# Patient Record
Sex: FEMALE | Race: OTHER | HISPANIC OR LATINO
[De-identification: names, ages, dates, MRNs, and addresses within clinical notes are randomized per-mention and may not be internally consistent; named-entity substitution may affect disease eponyms.]

---

## 2018-02-15 PROBLEM — Z00.00 ENCOUNTER FOR PREVENTIVE HEALTH EXAMINATION: Status: ACTIVE | Noted: 2018-02-15

## 2018-03-29 ENCOUNTER — RX RENEWAL (OUTPATIENT)
Age: 83
End: 2018-03-29

## 2018-03-29 DIAGNOSIS — R05 COUGH: ICD-10-CM

## 2018-04-10 ENCOUNTER — RX RENEWAL (OUTPATIENT)
Age: 83
End: 2018-04-10

## 2018-04-10 DIAGNOSIS — G47.00 INSOMNIA, UNSPECIFIED: ICD-10-CM

## 2018-04-18 ENCOUNTER — RX RENEWAL (OUTPATIENT)
Age: 83
End: 2018-04-18

## 2018-04-18 DIAGNOSIS — M54.9 DORSALGIA, UNSPECIFIED: ICD-10-CM

## 2018-05-17 ENCOUNTER — APPOINTMENT (OUTPATIENT)
Dept: INTERNAL MEDICINE | Facility: CLINIC | Age: 83
End: 2018-05-17
Payer: MEDICARE

## 2018-05-17 VITALS
BODY MASS INDEX: 27.25 KG/M2 | WEIGHT: 137 LBS | DIASTOLIC BLOOD PRESSURE: 66 MMHG | SYSTOLIC BLOOD PRESSURE: 146 MMHG | HEART RATE: 79 BPM | HEIGHT: 59.5 IN

## 2018-05-17 DIAGNOSIS — Z78.9 OTHER SPECIFIED HEALTH STATUS: ICD-10-CM

## 2018-05-17 DIAGNOSIS — Z98.1 ARTHRODESIS STATUS: ICD-10-CM

## 2018-05-17 PROCEDURE — 99214 OFFICE O/P EST MOD 30 MIN: CPT

## 2018-05-17 RX ORDER — DEXTROMETHORPHAN HYDROBROMIDE, GUAIFENESIN 20; 200 MG/10ML; MG/10ML
100-10 SOLUTION ORAL
Qty: 420 | Refills: 0 | Status: DISCONTINUED | COMMUNITY
Start: 2018-03-29 | End: 2018-05-17

## 2018-05-17 NOTE — HISTORY OF PRESENT ILLNESS
[FreeTextEntry1] : Follow up [de-identified] : 83 years old female with hypertension, type 2 Diabetes, osteoporosis, overall feeling well, chronic cervical spine pain, states CBS well controlled, as per home attendant appetite improved

## 2018-05-17 NOTE — PHYSICAL EXAM
[No Acute Distress] : no acute distress [Well-Appearing] : well-appearing [Normal Sclera/Conjunctiva] : normal sclera/conjunctiva [PERRL] : pupils equal round and reactive to light [EOMI] : extraocular movements intact [No JVD] : no jugular venous distention [Supple] : supple [No Lymphadenopathy] : no lymphadenopathy [Thyroid Normal, No Nodules] : the thyroid was normal and there were no nodules present [No Respiratory Distress] : no respiratory distress  [Clear to Auscultation] : lungs were clear to auscultation bilaterally [Normal Rate] : normal rate  [Regular Rhythm] : with a regular rhythm [Normal S1, S2] : normal S1 and S2 [No Murmur] : no murmur heard [No Edema] : there was no peripheral edema [Soft] : abdomen soft [Non Tender] : non-tender [No Masses] : no abdominal mass palpated [No HSM] : no HSM [Normal Gait] : normal gait [No Focal Deficits] : no focal deficits [de-identified] : tender cervical spine

## 2018-05-17 NOTE — ASSESSMENT
[FreeTextEntry1] : patient scheduled to do general labs next week; refills of ibuprofen given to take only as needed. she will continue all current medications

## 2018-05-25 ENCOUNTER — APPOINTMENT (OUTPATIENT)
Dept: INTERNAL MEDICINE | Facility: CLINIC | Age: 83
End: 2018-05-25
Payer: MEDICARE

## 2018-05-25 PROCEDURE — 36415 COLL VENOUS BLD VENIPUNCTURE: CPT

## 2018-05-29 LAB
25(OH)D3 SERPL-MCNC: 35.2 NG/ML
ALBUMIN SERPL ELPH-MCNC: 4.7 G/DL
ALP BLD-CCNC: 35 U/L
ALT SERPL-CCNC: 14 U/L
ANION GAP SERPL CALC-SCNC: 13 MMOL/L
AST SERPL-CCNC: 19 U/L
BASOPHILS # BLD AUTO: 0.01 K/UL
BASOPHILS NFR BLD AUTO: 0.2 %
BILIRUB SERPL-MCNC: 0.4 MG/DL
BUN SERPL-MCNC: 17 MG/DL
CALCIUM SERPL-MCNC: 9.9 MG/DL
CHLORIDE SERPL-SCNC: 101 MMOL/L
CHOLEST SERPL-MCNC: 184 MG/DL
CHOLEST/HDLC SERPL: 3.5 RATIO
CO2 SERPL-SCNC: 28 MMOL/L
CREAT SERPL-MCNC: 0.9 MG/DL
EOSINOPHIL # BLD AUTO: 0.08 K/UL
EOSINOPHIL NFR BLD AUTO: 1.5 %
FERRITIN SERPL-MCNC: 18 NG/ML
GLUCOSE SERPL-MCNC: 100 MG/DL
HBA1C MFR BLD HPLC: 5.6 %
HCT VFR BLD CALC: 35 %
HDLC SERPL-MCNC: 53 MG/DL
HGB BLD-MCNC: 11.1 G/DL
IMM GRANULOCYTES NFR BLD AUTO: 0 %
IRON SATN MFR SERPL: 18 %
IRON SERPL-MCNC: 56 UG/DL
LDLC SERPL CALC-MCNC: 97 MG/DL
LYMPHOCYTES # BLD AUTO: 1.38 K/UL
LYMPHOCYTES NFR BLD AUTO: 26.5 %
MAN DIFF?: NORMAL
MCHC RBC-ENTMCNC: 28.4 PG
MCHC RBC-ENTMCNC: 31.7 GM/DL
MCV RBC AUTO: 89.5 FL
MONOCYTES # BLD AUTO: 0.17 K/UL
MONOCYTES NFR BLD AUTO: 3.3 %
NEUTROPHILS # BLD AUTO: 3.57 K/UL
NEUTROPHILS NFR BLD AUTO: 68.5 %
PLATELET # BLD AUTO: 276 K/UL
POTASSIUM SERPL-SCNC: 4.6 MMOL/L
PROT SERPL-MCNC: 7.3 G/DL
RBC # BLD: 3.91 M/UL
RBC # FLD: 14.2 %
SODIUM SERPL-SCNC: 142 MMOL/L
T4 FREE SERPL-MCNC: 1.8 NG/DL
TIBC SERPL-MCNC: 314 UG/DL
TRIGL SERPL-MCNC: 171 MG/DL
TSH SERPL-ACNC: 3.36 UIU/ML
UIBC SERPL-MCNC: 258 UG/DL
VIT B12 SERPL-MCNC: 405 PG/ML
WBC # FLD AUTO: 5.21 K/UL

## 2018-06-08 ENCOUNTER — APPOINTMENT (OUTPATIENT)
Dept: INTERNAL MEDICINE | Facility: CLINIC | Age: 83
End: 2018-06-08

## 2018-06-28 ENCOUNTER — APPOINTMENT (OUTPATIENT)
Dept: INTERNAL MEDICINE | Facility: CLINIC | Age: 83
End: 2018-06-28
Payer: MEDICARE

## 2018-06-28 VITALS
HEART RATE: 71 BPM | DIASTOLIC BLOOD PRESSURE: 72 MMHG | SYSTOLIC BLOOD PRESSURE: 150 MMHG | BODY MASS INDEX: 27.06 KG/M2 | HEIGHT: 59.5 IN | WEIGHT: 136 LBS

## 2018-06-28 DIAGNOSIS — H81.13 BENIGN PAROXYSMAL VERTIGO, BILATERAL: ICD-10-CM

## 2018-06-28 DIAGNOSIS — K29.40 CHRONIC ATROPHIC GASTRITIS W/OUT BLEEDING: ICD-10-CM

## 2018-06-28 DIAGNOSIS — M47.812 SPONDYLOSIS W/OUT MYELOPATHY OR RADICULOPATHY, CERVICAL REGION: ICD-10-CM

## 2018-06-28 PROCEDURE — 99214 OFFICE O/P EST MOD 30 MIN: CPT

## 2018-06-28 NOTE — PLAN
[FreeTextEntry1] : patient clinically stable; cervical pain controlled, iron deficiency controlled, to continue all present medications, will follow up in three months for labs cmp, lipids a1c

## 2018-06-28 NOTE — HISTORY OF PRESENT ILLNESS
[FreeTextEntry1] : lab results\par Interview and discussion conducted in Egyptian by Egyptian speaking Physician.\par  [de-identified] : patient here today to review and discuss labs results, no acute complains\par

## 2018-07-13 ENCOUNTER — RX RENEWAL (OUTPATIENT)
Age: 83
End: 2018-07-13

## 2018-09-28 ENCOUNTER — RX RENEWAL (OUTPATIENT)
Age: 83
End: 2018-09-28

## 2018-09-28 RX ORDER — FERROUS SULFATE TAB EC 325 MG (65 MG FE EQUIVALENT) 325 (65 FE) MG
325 (65 FE) TABLET DELAYED RESPONSE ORAL
Qty: 30 | Refills: 5 | Status: ACTIVE | COMMUNITY
Start: 2018-04-10 | End: 1900-01-01

## 2018-10-12 ENCOUNTER — APPOINTMENT (OUTPATIENT)
Dept: INTERNAL MEDICINE | Facility: CLINIC | Age: 83
End: 2018-10-12
Payer: MEDICARE

## 2018-10-12 PROCEDURE — 36415 COLL VENOUS BLD VENIPUNCTURE: CPT

## 2018-10-15 LAB
ALBUMIN SERPL ELPH-MCNC: 4.7 G/DL
ALP BLD-CCNC: 43 U/L
ALT SERPL-CCNC: 17 U/L
ANION GAP SERPL CALC-SCNC: 16 MMOL/L
AST SERPL-CCNC: 26 U/L
BILIRUB SERPL-MCNC: 0.4 MG/DL
BUN SERPL-MCNC: 13 MG/DL
CALCIUM SERPL-MCNC: 10.3 MG/DL
CHLORIDE SERPL-SCNC: 97 MMOL/L
CHOLEST SERPL-MCNC: 211 MG/DL
CHOLEST/HDLC SERPL: 4.2 RATIO
CO2 SERPL-SCNC: 28 MMOL/L
CREAT SERPL-MCNC: 0.97 MG/DL
GLUCOSE SERPL-MCNC: 169 MG/DL
HBA1C MFR BLD HPLC: 6 %
HDLC SERPL-MCNC: 50 MG/DL
LDLC SERPL CALC-MCNC: 127 MG/DL
POTASSIUM SERPL-SCNC: 3.9 MMOL/L
PROT SERPL-MCNC: 7.4 G/DL
SODIUM SERPL-SCNC: 141 MMOL/L
TRIGL SERPL-MCNC: 169 MG/DL

## 2018-10-30 ENCOUNTER — APPOINTMENT (OUTPATIENT)
Dept: INTERNAL MEDICINE | Facility: CLINIC | Age: 83
End: 2018-10-30
Payer: MEDICARE

## 2018-10-30 VITALS
SYSTOLIC BLOOD PRESSURE: 153 MMHG | DIASTOLIC BLOOD PRESSURE: 72 MMHG | WEIGHT: 137 LBS | HEART RATE: 94 BPM | BODY MASS INDEX: 27.25 KG/M2 | HEIGHT: 59.5 IN

## 2018-10-30 DIAGNOSIS — Z23 ENCOUNTER FOR IMMUNIZATION: ICD-10-CM

## 2018-10-30 PROCEDURE — G0008: CPT

## 2018-10-30 PROCEDURE — 90686 IIV4 VACC NO PRSV 0.5 ML IM: CPT

## 2018-10-30 PROCEDURE — 99214 OFFICE O/P EST MOD 30 MIN: CPT | Mod: 25

## 2018-10-30 NOTE — HISTORY OF PRESENT ILLNESS
[FreeTextEntry1] : lab results\par Interview and discussion conducted in Iranian by Iranian speaking Physician.\par  [de-identified] : patient here today to review and discuss labs results, no acute complains\par

## 2018-10-30 NOTE — PLAN
[FreeTextEntry1] : low cholesterol, low triglycerides diet,dietary counseling given; dietary avoidance discussed; diet and exercise reviewed with patient\par Dietary counseling given, dietary avoidance discussed, diet and exercise reviewed with patient; patient reminded of importance of aerobic exercise, weight control, dietary compliance and regular glucose monitoring\par follow up in three months for general labs

## 2018-12-07 ENCOUNTER — RX RENEWAL (OUTPATIENT)
Age: 83
End: 2018-12-07

## 2018-12-07 RX ORDER — PIOGLITAZONE HYDROCHLORIDE 45 MG/1
45 TABLET ORAL DAILY
Qty: 90 | Refills: 1 | Status: ACTIVE | COMMUNITY
Start: 2018-04-10 | End: 1900-01-01

## 2018-12-07 RX ORDER — MECLIZINE HYDROCHLORIDE 12.5 MG/1
12.5 TABLET ORAL
Qty: 30 | Refills: 2 | Status: ACTIVE | COMMUNITY
Start: 2018-04-10 | End: 1900-01-01

## 2018-12-07 RX ORDER — METFORMIN HYDROCHLORIDE 1000 MG/1
1000 TABLET, COATED ORAL
Qty: 180 | Refills: 3 | Status: ACTIVE | COMMUNITY
Start: 2018-04-10 | End: 1900-01-01

## 2018-12-07 RX ORDER — ALENDRONATE SODIUM 70 MG/1
70 TABLET ORAL
Qty: 4 | Refills: 5 | Status: ACTIVE | COMMUNITY
Start: 2018-03-29 | End: 1900-01-01

## 2019-02-26 ENCOUNTER — OTHER (OUTPATIENT)
Age: 84
End: 2019-02-26

## 2019-03-05 ENCOUNTER — APPOINTMENT (OUTPATIENT)
Dept: INTERNAL MEDICINE | Facility: CLINIC | Age: 84
End: 2019-03-05

## 2019-03-14 ENCOUNTER — APPOINTMENT (OUTPATIENT)
Dept: INTERNAL MEDICINE | Facility: CLINIC | Age: 84
End: 2019-03-14
Payer: MEDICARE

## 2019-03-14 PROCEDURE — 36415 COLL VENOUS BLD VENIPUNCTURE: CPT

## 2019-03-15 LAB
25(OH)D3 SERPL-MCNC: 34.1 NG/ML
ALBUMIN SERPL ELPH-MCNC: 4.6 G/DL
ALP BLD-CCNC: 45 U/L
ALT SERPL-CCNC: 19 U/L
ANION GAP SERPL CALC-SCNC: 15 MMOL/L
AST SERPL-CCNC: 24 U/L
BASOPHILS # BLD AUTO: 0.02 K/UL
BASOPHILS NFR BLD AUTO: 0.5 %
BILIRUB SERPL-MCNC: 0.4 MG/DL
BUN SERPL-MCNC: 16 MG/DL
CALCIUM SERPL-MCNC: 10.1 MG/DL
CHLORIDE SERPL-SCNC: 98 MMOL/L
CHOLEST SERPL-MCNC: 208 MG/DL
CHOLEST/HDLC SERPL: 4 RATIO
CO2 SERPL-SCNC: 30 MMOL/L
CREAT SERPL-MCNC: 0.94 MG/DL
EOSINOPHIL # BLD AUTO: 0.14 K/UL
EOSINOPHIL NFR BLD AUTO: 3.2 %
GLUCOSE SERPL-MCNC: 122 MG/DL
HBA1C MFR BLD HPLC: 6.2 %
HCT VFR BLD CALC: 38.1 %
HDLC SERPL-MCNC: 52 MG/DL
HGB BLD-MCNC: 11.8 G/DL
IMM GRANULOCYTES NFR BLD AUTO: 0.2 %
LDLC SERPL CALC-MCNC: 122 MG/DL
LYMPHOCYTES # BLD AUTO: 1.46 K/UL
LYMPHOCYTES NFR BLD AUTO: 33.3 %
MAN DIFF?: NORMAL
MCHC RBC-ENTMCNC: 28.2 PG
MCHC RBC-ENTMCNC: 31 GM/DL
MCV RBC AUTO: 91.1 FL
MONOCYTES # BLD AUTO: 0.24 K/UL
MONOCYTES NFR BLD AUTO: 5.5 %
NEUTROPHILS # BLD AUTO: 2.51 K/UL
NEUTROPHILS NFR BLD AUTO: 57.3 %
PLATELET # BLD AUTO: 293 K/UL
POTASSIUM SERPL-SCNC: 4.5 MMOL/L
PROT SERPL-MCNC: 7.3 G/DL
RBC # BLD: 4.18 M/UL
RBC # FLD: 13.4 %
SODIUM SERPL-SCNC: 142 MMOL/L
T4 FREE SERPL-MCNC: 1.7 NG/DL
TRIGL SERPL-MCNC: 168 MG/DL
TSH SERPL-ACNC: 3.79 UIU/ML
VIT B12 SERPL-MCNC: 441 PG/ML
WBC # FLD AUTO: 4.38 K/UL

## 2019-03-29 ENCOUNTER — APPOINTMENT (OUTPATIENT)
Dept: INTERNAL MEDICINE | Facility: CLINIC | Age: 84
End: 2019-03-29
Payer: MEDICARE

## 2019-03-29 VITALS
BODY MASS INDEX: 27.85 KG/M2 | OXYGEN SATURATION: 99 % | DIASTOLIC BLOOD PRESSURE: 83 MMHG | TEMPERATURE: 97.1 F | HEART RATE: 88 BPM | HEIGHT: 59.5 IN | WEIGHT: 140 LBS | RESPIRATION RATE: 16 BRPM | SYSTOLIC BLOOD PRESSURE: 136 MMHG

## 2019-03-29 DIAGNOSIS — D50.8 OTHER IRON DEFICIENCY ANEMIAS: ICD-10-CM

## 2019-03-29 DIAGNOSIS — M81.0 AGE-RELATED OSTEOPOROSIS W/OUT CURRENT PATHOLOGICAL FRACTURE: ICD-10-CM

## 2019-03-29 PROCEDURE — 99214 OFFICE O/P EST MOD 30 MIN: CPT

## 2019-03-29 NOTE — PLAN
[FreeTextEntry1] : Diet compliance discussed.\par low cholesterol, low triglycerides diet,dietary counseling given; dietary avoidance discussed; diet and exercise reviewed with patient\par Dietary counseling given, dietary avoidance discussed, diet and exercise reviewed with patient; patient reminded of importance of aerobic exercise, weight control, dietary compliance and regular glucose monitoring\par follow up in three months for routine general labs

## 2019-03-29 NOTE — HISTORY OF PRESENT ILLNESS
[FreeTextEntry1] : lab results\par Interview and discussion conducted in Syrian by Syrian speaking Physician.\par  [de-identified] : patient here today to review and discuss labs results, no acute complains\par

## 2019-04-08 ENCOUNTER — RX RENEWAL (OUTPATIENT)
Age: 84
End: 2019-04-08

## 2019-04-08 RX ORDER — ESCITALOPRAM OXALATE 10 MG/1
10 TABLET ORAL DAILY
Qty: 30 | Refills: 2 | Status: ACTIVE | COMMUNITY
Start: 2018-04-10 | End: 1900-01-01

## 2019-04-19 ENCOUNTER — RX RENEWAL (OUTPATIENT)
Age: 84
End: 2019-04-19

## 2019-04-19 RX ORDER — OMEPRAZOLE 40 MG/1
40 CAPSULE, DELAYED RELEASE ORAL
Qty: 30 | Refills: 2 | Status: ACTIVE | COMMUNITY
Start: 2018-04-10 | End: 1900-01-01

## 2019-05-23 ENCOUNTER — RX CHANGE (OUTPATIENT)
Age: 84
End: 2019-05-23

## 2019-05-23 RX ORDER — LOSARTAN POTASSIUM AND HYDROCHLOROTHIAZIDE 12.5; 5 MG/1; MG/1
50-12.5 TABLET ORAL DAILY
Qty: 90 | Refills: 1 | Status: DISCONTINUED | COMMUNITY
Start: 2018-04-10 | End: 2019-05-23

## 2019-06-05 ENCOUNTER — APPOINTMENT (OUTPATIENT)
Dept: INTERNAL MEDICINE | Facility: CLINIC | Age: 84
End: 2019-06-05
Payer: MEDICARE

## 2019-06-05 VITALS
HEIGHT: 59.5 IN | BODY MASS INDEX: 28.65 KG/M2 | RESPIRATION RATE: 16 BRPM | OXYGEN SATURATION: 96 % | WEIGHT: 144 LBS | DIASTOLIC BLOOD PRESSURE: 65 MMHG | HEART RATE: 86 BPM | TEMPERATURE: 98.7 F | SYSTOLIC BLOOD PRESSURE: 125 MMHG

## 2019-06-05 DIAGNOSIS — R07.1 CHEST PAIN ON BREATHING: ICD-10-CM

## 2019-06-05 DIAGNOSIS — M54.2 CERVICALGIA: ICD-10-CM

## 2019-06-05 PROCEDURE — 99213 OFFICE O/P EST LOW 20 MIN: CPT

## 2019-06-05 RX ORDER — IBUPROFEN 400 MG/1
400 TABLET, FILM COATED ORAL 3 TIMES DAILY
Qty: 30 | Refills: 0 | Status: ACTIVE | COMMUNITY
Start: 2018-04-18 | End: 1900-01-01

## 2019-06-05 NOTE — HISTORY OF PRESENT ILLNESS
[FreeTextEntry1] : right breast pain\par neck pain\par Interview and discussion conducted in Chinese by Chinese speaking Physician.\par  [de-identified] : 84 years old female walks in complaining of right side chest pain, as per home attendant she told her she is having pain for several days , also over right upper breast, refers cervical pain as well

## 2019-06-05 NOTE — PHYSICAL EXAM
[No Acute Distress] : no acute distress [Well-Appearing] : well-appearing [No JVD] : no jugular venous distention [Supple] : supple [No Respiratory Distress] : no respiratory distress  [Clear to Auscultation] : lungs were clear to auscultation bilaterally [Normal Rate] : normal rate  [Regular Rhythm] : with a regular rhythm [Normal S1, S2] : normal S1 and S2 [No Murmur] : no murmur heard [de-identified] : tender right costochondral joint 4th space

## 2019-07-10 ENCOUNTER — APPOINTMENT (OUTPATIENT)
Dept: INTERNAL MEDICINE | Facility: CLINIC | Age: 84
End: 2019-07-10
Payer: MEDICARE

## 2019-07-10 PROCEDURE — 36415 COLL VENOUS BLD VENIPUNCTURE: CPT

## 2019-07-11 LAB
ALBUMIN SERPL ELPH-MCNC: 4.5 G/DL
ALP BLD-CCNC: 39 U/L
ALT SERPL-CCNC: 14 U/L
ANION GAP SERPL CALC-SCNC: 11 MMOL/L
AST SERPL-CCNC: 20 U/L
BILIRUB SERPL-MCNC: 0.6 MG/DL
BUN SERPL-MCNC: 14 MG/DL
CALCIUM SERPL-MCNC: 10.1 MG/DL
CHLORIDE SERPL-SCNC: 100 MMOL/L
CHOLEST SERPL-MCNC: 156 MG/DL
CHOLEST/HDLC SERPL: 3.9 RATIO
CO2 SERPL-SCNC: 30 MMOL/L
CREAT SERPL-MCNC: 1.05 MG/DL
ESTIMATED AVERAGE GLUCOSE: 120 MG/DL
GLUCOSE SERPL-MCNC: 103 MG/DL
HBA1C MFR BLD HPLC: 5.8 %
HDLC SERPL-MCNC: 40 MG/DL
LDLC SERPL CALC-MCNC: 82 MG/DL
POTASSIUM SERPL-SCNC: 4.7 MMOL/L
PROT SERPL-MCNC: 7.3 G/DL
SODIUM SERPL-SCNC: 141 MMOL/L
T3FREE SERPL-MCNC: 2.92 PG/ML
T4 FREE SERPL-MCNC: 1.5 NG/DL
TRIGL SERPL-MCNC: 169 MG/DL
TSH SERPL-ACNC: 2.65 UIU/ML

## 2019-07-24 ENCOUNTER — APPOINTMENT (OUTPATIENT)
Dept: INTERNAL MEDICINE | Facility: CLINIC | Age: 84
End: 2019-07-24
Payer: MEDICARE

## 2019-07-24 VITALS
OXYGEN SATURATION: 96 % | TEMPERATURE: 97.3 F | DIASTOLIC BLOOD PRESSURE: 69 MMHG | RESPIRATION RATE: 16 BRPM | SYSTOLIC BLOOD PRESSURE: 130 MMHG | BODY MASS INDEX: 27.45 KG/M2 | WEIGHT: 138 LBS | HEART RATE: 79 BPM | HEIGHT: 59.5 IN

## 2019-07-24 DIAGNOSIS — R05 COUGH: ICD-10-CM

## 2019-07-24 DIAGNOSIS — E78.2 MIXED HYPERLIPIDEMIA: ICD-10-CM

## 2019-07-24 DIAGNOSIS — T46.4X5A COUGH: ICD-10-CM

## 2019-07-24 DIAGNOSIS — I10 ESSENTIAL (PRIMARY) HYPERTENSION: ICD-10-CM

## 2019-07-24 PROCEDURE — 99214 OFFICE O/P EST MOD 30 MIN: CPT

## 2019-07-24 RX ORDER — DONEPEZIL HYDROCHLORIDE 5 MG/1
5 TABLET ORAL
Qty: 30 | Refills: 2 | Status: ACTIVE | COMMUNITY
Start: 2018-04-10 | End: 1900-01-01

## 2019-07-24 RX ORDER — HYDROCHLOROTHIAZIDE 12.5 MG/1
12.5 TABLET ORAL DAILY
Qty: 30 | Refills: 2 | Status: ACTIVE | COMMUNITY
Start: 2019-07-24 | End: 1900-01-01

## 2019-07-24 RX ORDER — AMLODIPINE BESYLATE 5 MG/1
5 TABLET ORAL DAILY
Qty: 30 | Refills: 5 | Status: ACTIVE | COMMUNITY
Start: 2019-07-24 | End: 1900-01-01

## 2019-07-24 NOTE — PLAN
[FreeTextEntry1] : discontinue lisinopril/hctz, change to amlopine  and hctz.\par low cholesterol, low triglycerides diet,dietary counseling given; dietary avoidance discussed; diet and exercise reviewed with patient\par Dietary counseling given, dietary avoidance discussed, diet and exercise reviewed with patient; patient reminded of importance of aerobic exercise, weight control, dietary compliance and regular glucose monitoring\par follow up in three months for labs if cough continue after two weeks to call me

## 2019-07-24 NOTE — PHYSICAL EXAM
[Well Nourished] : well nourished [No Acute Distress] : no acute distress [Well Developed] : well developed [Well-Appearing] : well-appearing [Normal Sclera/Conjunctiva] : normal sclera/conjunctiva [PERRL] : pupils equal round and reactive to light [EOMI] : extraocular movements intact [Normal Outer Ear/Nose] : the outer ears and nose were normal in appearance [No JVD] : no jugular venous distention [Normal Oropharynx] : the oropharynx was normal [No Lymphadenopathy] : no lymphadenopathy [Supple] : supple [Thyroid Normal, No Nodules] : the thyroid was normal and there were no nodules present [No Respiratory Distress] : no respiratory distress  [No Accessory Muscle Use] : no accessory muscle use [Clear to Auscultation] : lungs were clear to auscultation bilaterally [Normal Rate] : normal rate  [Regular Rhythm] : with a regular rhythm [Normal S1, S2] : normal S1 and S2 [No Murmur] : no murmur heard [No Carotid Bruits] : no carotid bruits [No Abdominal Bruit] : a ~M bruit was not heard ~T in the abdomen [No Varicosities] : no varicosities [Pedal Pulses Present] : the pedal pulses are present [No Edema] : there was no peripheral edema [No Palpable Aorta] : no palpable aorta [No Extremity Clubbing/Cyanosis] : no extremity clubbing/cyanosis [Soft] : abdomen soft [Non Tender] : non-tender [Non-distended] : non-distended [No Masses] : no abdominal mass palpated [No HSM] : no HSM [Normal Posterior Cervical Nodes] : no posterior cervical lymphadenopathy [Normal Bowel Sounds] : normal bowel sounds [Normal Anterior Cervical Nodes] : no anterior cervical lymphadenopathy [No CVA Tenderness] : no CVA  tenderness [Grossly Normal Strength/Tone] : grossly normal strength/tone [No Spinal Tenderness] : no spinal tenderness [No Joint Swelling] : no joint swelling [No Rash] : no rash [Coordination Grossly Intact] : coordination grossly intact [Normal Gait] : normal gait [No Focal Deficits] : no focal deficits [Normal Affect] : the affect was normal [Deep Tendon Reflexes (DTR)] : deep tendon reflexes were 2+ and symmetric [Normal Insight/Judgement] : insight and judgment were intact

## 2019-07-24 NOTE — HISTORY OF PRESENT ILLNESS
[FreeTextEntry1] : lab results\par Interview and discussion conducted in South African by South African speaking Physician.\par  [de-identified] : patient here today to review and discuss labs results complains of dry cough since she started lisinopril, \par

## 2019-08-20 ENCOUNTER — APPOINTMENT (OUTPATIENT)
Dept: INTERNAL MEDICINE | Facility: CLINIC | Age: 84
End: 2019-08-20
Payer: MEDICARE

## 2019-08-20 VITALS
RESPIRATION RATE: 16 BRPM | DIASTOLIC BLOOD PRESSURE: 73 MMHG | HEIGHT: 59.5 IN | TEMPERATURE: 98.3 F | WEIGHT: 138 LBS | SYSTOLIC BLOOD PRESSURE: 124 MMHG | HEART RATE: 102 BPM | BODY MASS INDEX: 27.45 KG/M2 | OXYGEN SATURATION: 97 %

## 2019-08-20 DIAGNOSIS — R21 RASH AND OTHER NONSPECIFIC SKIN ERUPTION: ICD-10-CM

## 2019-08-20 DIAGNOSIS — E11.9 TYPE 2 DIABETES MELLITUS W/OUT COMPLICATIONS: ICD-10-CM

## 2019-08-20 DIAGNOSIS — E03.9 HYPOTHYROIDISM, UNSPECIFIED: ICD-10-CM

## 2019-08-20 DIAGNOSIS — L85.3 XEROSIS CUTIS: ICD-10-CM

## 2019-08-20 PROCEDURE — 99213 OFFICE O/P EST LOW 20 MIN: CPT

## 2019-08-20 RX ORDER — MOMETASONE FUROATE 1 MG/G
0.1 CREAM TOPICAL DAILY
Qty: 1 | Refills: 1 | Status: ACTIVE | COMMUNITY
Start: 2019-08-20 | End: 1900-01-01

## 2019-08-20 RX ORDER — AMMONIUM LACTATE 12 %
12 CREAM (GRAM) TOPICAL TWICE DAILY
Qty: 1 | Refills: 2 | Status: ACTIVE | COMMUNITY
Start: 2019-08-20 | End: 1900-01-01

## 2019-08-20 NOTE — PHYSICAL EXAM
[Normal] : no acute distress, well nourished, well developed and well-appearing [de-identified] : dry skin; mild erythematous rash on arms , back and chest

## 2019-08-20 NOTE — HISTORY OF PRESENT ILLNESS
[FreeTextEntry8] : body rash\par Interview and discussion conducted in Upper sorbian by Upper sorbian speaking Physician.\par 84 years old female walks in with home attendant complaining of rash on chest , back and arms since past Saturday associated with pruritus

## 2019-10-02 ENCOUNTER — RX RENEWAL (OUTPATIENT)
Age: 84
End: 2019-10-02

## 2019-10-02 RX ORDER — LEVOTHYROXINE SODIUM 0.05 MG/1
50 TABLET ORAL DAILY
Qty: 90 | Refills: 1 | Status: ACTIVE | COMMUNITY
Start: 2018-04-10 | End: 1900-01-01

## 2019-10-02 RX ORDER — SIMVASTATIN 20 MG/1
20 TABLET, FILM COATED ORAL DAILY
Qty: 90 | Refills: 1 | Status: ACTIVE | COMMUNITY
Start: 2018-04-10 | End: 1900-01-01

## 2019-10-02 RX ORDER — LISINOPRIL AND HYDROCHLOROTHIAZIDE TABLETS 20; 12.5 MG/1; MG/1
20-12.5 TABLET ORAL DAILY
Qty: 90 | Refills: 1 | Status: ACTIVE | COMMUNITY
Start: 2019-05-23 | End: 1900-01-01

## 2019-10-02 RX ORDER — ASPIRIN 81 MG/1
81 TABLET ORAL DAILY
Qty: 90 | Refills: 1 | Status: ACTIVE | COMMUNITY
Start: 2018-04-10 | End: 1900-01-01

## 2019-10-02 RX ORDER — GABAPENTIN 100 MG/1
100 CAPSULE ORAL 3 TIMES DAILY
Qty: 90 | Refills: 1 | Status: ACTIVE | COMMUNITY
Start: 2018-04-10 | End: 1900-01-01

## 2019-10-31 ENCOUNTER — APPOINTMENT (OUTPATIENT)
Dept: INTERNAL MEDICINE | Facility: CLINIC | Age: 84
End: 2019-10-31

## 2021-03-29 ENCOUNTER — INPATIENT (INPATIENT)
Facility: HOSPITAL | Age: 86
LOS: 8 days | Discharge: NURSING, REHAB OR LONG-TERM CARE | DRG: 884 | End: 2021-04-07
Attending: INTERNAL MEDICINE | Admitting: INTERNAL MEDICINE
Payer: MEDICARE

## 2021-03-29 VITALS
RESPIRATION RATE: 18 BRPM | DIASTOLIC BLOOD PRESSURE: 72 MMHG | TEMPERATURE: 98 F | HEART RATE: 82 BPM | SYSTOLIC BLOOD PRESSURE: 125 MMHG | WEIGHT: 138.01 LBS

## 2021-03-29 DIAGNOSIS — F03.90 UNSPECIFIED DEMENTIA WITHOUT BEHAVIORAL DISTURBANCE: ICD-10-CM

## 2021-03-29 LAB
ALBUMIN SERPL ELPH-MCNC: 3.8 G/DL — SIGNIFICANT CHANGE UP (ref 3.5–5)
ALP SERPL-CCNC: 62 U/L — SIGNIFICANT CHANGE UP (ref 40–120)
ALT FLD-CCNC: 32 U/L DA — SIGNIFICANT CHANGE UP (ref 10–60)
ANION GAP SERPL CALC-SCNC: 7 MMOL/L — SIGNIFICANT CHANGE UP (ref 5–17)
APTT BLD: 34.6 SEC — SIGNIFICANT CHANGE UP (ref 27.5–35.5)
AST SERPL-CCNC: 25 U/L — SIGNIFICANT CHANGE UP (ref 10–40)
BASOPHILS # BLD AUTO: 0.03 K/UL — SIGNIFICANT CHANGE UP (ref 0–0.2)
BASOPHILS NFR BLD AUTO: 0.5 % — SIGNIFICANT CHANGE UP (ref 0–2)
BILIRUB SERPL-MCNC: 0.4 MG/DL — SIGNIFICANT CHANGE UP (ref 0.2–1.2)
BUN SERPL-MCNC: 21 MG/DL — HIGH (ref 7–18)
CALCIUM SERPL-MCNC: 9.2 MG/DL — SIGNIFICANT CHANGE UP (ref 8.4–10.5)
CHLORIDE SERPL-SCNC: 103 MMOL/L — SIGNIFICANT CHANGE UP (ref 96–108)
CK SERPL-CCNC: 59 U/L — SIGNIFICANT CHANGE UP (ref 21–215)
CO2 SERPL-SCNC: 28 MMOL/L — SIGNIFICANT CHANGE UP (ref 22–31)
CREAT SERPL-MCNC: 0.92 MG/DL — SIGNIFICANT CHANGE UP (ref 0.5–1.3)
EOSINOPHIL # BLD AUTO: 0.07 K/UL — SIGNIFICANT CHANGE UP (ref 0–0.5)
EOSINOPHIL NFR BLD AUTO: 1.3 % — SIGNIFICANT CHANGE UP (ref 0–6)
GLUCOSE SERPL-MCNC: 127 MG/DL — HIGH (ref 70–99)
HCT VFR BLD CALC: 38 % — SIGNIFICANT CHANGE UP (ref 34.5–45)
HGB BLD-MCNC: 12.2 G/DL — SIGNIFICANT CHANGE UP (ref 11.5–15.5)
IMM GRANULOCYTES NFR BLD AUTO: 0.2 % — SIGNIFICANT CHANGE UP (ref 0–1.5)
INR BLD: 0.96 RATIO — SIGNIFICANT CHANGE UP (ref 0.88–1.16)
LYMPHOCYTES # BLD AUTO: 1.87 K/UL — SIGNIFICANT CHANGE UP (ref 1–3.3)
LYMPHOCYTES # BLD AUTO: 33.9 % — SIGNIFICANT CHANGE UP (ref 13–44)
MCHC RBC-ENTMCNC: 27.7 PG — SIGNIFICANT CHANGE UP (ref 27–34)
MCHC RBC-ENTMCNC: 32.1 GM/DL — SIGNIFICANT CHANGE UP (ref 32–36)
MCV RBC AUTO: 86.4 FL — SIGNIFICANT CHANGE UP (ref 80–100)
MONOCYTES # BLD AUTO: 0.25 K/UL — SIGNIFICANT CHANGE UP (ref 0–0.9)
MONOCYTES NFR BLD AUTO: 4.5 % — SIGNIFICANT CHANGE UP (ref 2–14)
NEUTROPHILS # BLD AUTO: 3.29 K/UL — SIGNIFICANT CHANGE UP (ref 1.8–7.4)
NEUTROPHILS NFR BLD AUTO: 59.6 % — SIGNIFICANT CHANGE UP (ref 43–77)
NRBC # BLD: 0 /100 WBCS — SIGNIFICANT CHANGE UP (ref 0–0)
PLATELET # BLD AUTO: 264 K/UL — SIGNIFICANT CHANGE UP (ref 150–400)
POTASSIUM SERPL-MCNC: 4 MMOL/L — SIGNIFICANT CHANGE UP (ref 3.5–5.3)
POTASSIUM SERPL-SCNC: 4 MMOL/L — SIGNIFICANT CHANGE UP (ref 3.5–5.3)
PROT SERPL-MCNC: 7.8 G/DL — SIGNIFICANT CHANGE UP (ref 6–8.3)
PROTHROM AB SERPL-ACNC: 11.5 SEC — SIGNIFICANT CHANGE UP (ref 10.6–13.6)
RBC # BLD: 4.4 M/UL — SIGNIFICANT CHANGE UP (ref 3.8–5.2)
RBC # FLD: 13.9 % — SIGNIFICANT CHANGE UP (ref 10.3–14.5)
SARS-COV-2 RNA SPEC QL NAA+PROBE: SIGNIFICANT CHANGE UP
SODIUM SERPL-SCNC: 138 MMOL/L — SIGNIFICANT CHANGE UP (ref 135–145)
TROPONIN I SERPL-MCNC: <0.015 NG/ML — SIGNIFICANT CHANGE UP (ref 0–0.04)
WBC # BLD: 5.52 K/UL — SIGNIFICANT CHANGE UP (ref 3.8–10.5)
WBC # FLD AUTO: 5.52 K/UL — SIGNIFICANT CHANGE UP (ref 3.8–10.5)

## 2021-03-29 RX ORDER — INSULIN LISPRO 100/ML
VIAL (ML) SUBCUTANEOUS AT BEDTIME
Refills: 0 | Status: DISCONTINUED | OUTPATIENT
Start: 2021-03-29 | End: 2021-04-07

## 2021-03-29 RX ORDER — HALOPERIDOL DECANOATE 100 MG/ML
2 INJECTION INTRAMUSCULAR ONCE
Refills: 0 | Status: COMPLETED | OUTPATIENT
Start: 2021-03-29 | End: 2021-03-29

## 2021-03-29 RX ORDER — DEXTROSE 50 % IN WATER 50 %
25 SYRINGE (ML) INTRAVENOUS ONCE
Refills: 0 | Status: DISCONTINUED | OUTPATIENT
Start: 2021-03-29 | End: 2021-03-30

## 2021-03-29 RX ORDER — MEMANTINE HYDROCHLORIDE 10 MG/1
5 TABLET ORAL
Refills: 0 | Status: DISCONTINUED | OUTPATIENT
Start: 2021-03-29 | End: 2021-04-07

## 2021-03-29 RX ORDER — SIMVASTATIN 20 MG/1
20 TABLET, FILM COATED ORAL AT BEDTIME
Refills: 0 | Status: DISCONTINUED | OUTPATIENT
Start: 2021-03-29 | End: 2021-04-01

## 2021-03-29 RX ORDER — DONEPEZIL HYDROCHLORIDE 10 MG/1
5 TABLET, FILM COATED ORAL AT BEDTIME
Refills: 0 | Status: DISCONTINUED | OUTPATIENT
Start: 2021-03-29 | End: 2021-04-07

## 2021-03-29 RX ORDER — DEXTROSE 50 % IN WATER 50 %
15 SYRINGE (ML) INTRAVENOUS ONCE
Refills: 0 | Status: DISCONTINUED | OUTPATIENT
Start: 2021-03-29 | End: 2021-03-30

## 2021-03-29 RX ORDER — GABAPENTIN 400 MG/1
100 CAPSULE ORAL THREE TIMES A DAY
Refills: 0 | Status: DISCONTINUED | OUTPATIENT
Start: 2021-03-29 | End: 2021-04-07

## 2021-03-29 RX ORDER — GLUCAGON INJECTION, SOLUTION 0.5 MG/.1ML
1 INJECTION, SOLUTION SUBCUTANEOUS ONCE
Refills: 0 | Status: DISCONTINUED | OUTPATIENT
Start: 2021-03-29 | End: 2021-04-01

## 2021-03-29 RX ORDER — SODIUM CHLORIDE 9 MG/ML
1000 INJECTION, SOLUTION INTRAVENOUS
Refills: 0 | Status: DISCONTINUED | OUTPATIENT
Start: 2021-03-29 | End: 2021-03-30

## 2021-03-29 RX ORDER — PANTOPRAZOLE SODIUM 20 MG/1
40 TABLET, DELAYED RELEASE ORAL
Refills: 0 | Status: DISCONTINUED | OUTPATIENT
Start: 2021-03-29 | End: 2021-04-07

## 2021-03-29 RX ORDER — DEXTROSE 50 % IN WATER 50 %
12.5 SYRINGE (ML) INTRAVENOUS ONCE
Refills: 0 | Status: DISCONTINUED | OUTPATIENT
Start: 2021-03-29 | End: 2021-03-30

## 2021-03-29 RX ORDER — LORATADINE 10 MG/1
10 TABLET ORAL DAILY
Refills: 0 | Status: DISCONTINUED | OUTPATIENT
Start: 2021-03-29 | End: 2021-04-02

## 2021-03-29 RX ORDER — LEVOTHYROXINE SODIUM 125 MCG
50 TABLET ORAL DAILY
Refills: 0 | Status: DISCONTINUED | OUTPATIENT
Start: 2021-03-29 | End: 2021-04-07

## 2021-03-29 RX ORDER — ESCITALOPRAM OXALATE 10 MG/1
10 TABLET, FILM COATED ORAL DAILY
Refills: 0 | Status: DISCONTINUED | OUTPATIENT
Start: 2021-03-29 | End: 2021-04-07

## 2021-03-29 RX ORDER — INSULIN LISPRO 100/ML
VIAL (ML) SUBCUTANEOUS
Refills: 0 | Status: DISCONTINUED | OUTPATIENT
Start: 2021-03-29 | End: 2021-04-07

## 2021-03-29 RX ADMIN — HALOPERIDOL DECANOATE 2 MILLIGRAM(S): 100 INJECTION INTRAMUSCULAR at 22:41

## 2021-03-29 NOTE — ED PROVIDER NOTE - CLINICAL SUMMARY MEDICAL DECISION MAKING FREE TEXT BOX
Pt with worsening dementia. c/o constant neck pain worse at night. no pain currently. admit for placement

## 2021-03-29 NOTE — H&P ADULT - NSHPPHYSICALEXAM_GEN_ALL_CORE
CONSTITUTIONAL: Well appearing, well nourished, awake, alert and in no apparent distress  ENMT: Airway patent, Nasal mucosa clear. Mouth with normal mucosa. Throat has no vesicles, no oropharyngeal exudates and uvula is midline.  EYES: Clear bilaterally, pupils equal, round and reactive to light. EOMI.  CARDIAC: Normal rate, regular rhythm.  Heart sounds S1, S2.  No murmurs, rubs or gallops   RESPIRATORY: Breath sounds clear and equal bilaterally. No wheezes, rhales or rhonchi  MUSCULOSKELETAL: Spine appears normal, range of motion is not limited, no muscle or joint tenderness  EXTREMITIES: No edema, cyanosis or deformity   NEUROLOGICAL: Alert and oriented, no focal deficits, no motor or sensory deficits.  SKIN: No rash, skin turgor    ABd : NT , ND , soft

## 2021-03-29 NOTE — H&P ADULT - ATTENDING COMMENTS
87 yo F   with medical history of  Diabetes Mellitus Hypertension HLD dementia, with worsening agitation at night. patient complaining of left neck, especially at night. Pt was prescribed gabapentin which is not helping. patient had HHA during day but is alone at night. Niece is worried that patient is not safe at home, no increase in HHA hours was available due to pandemic. niece is working on getting pt into nursing home and that requires admission as per niece. no recent illness, no vomiting no diarrhea      Pt's niece Zuly Brown        assessment  --  intractable neck pain, h/o Diabetes Mellitus Hypertension HLD dementia, c-spine hardware    plan  --  admit to med, pain control cont preadmit home meds, gi and dvt profilaxis,  cbc, bmp, mg, phos, lipids, tsh, bld cx, ua, ucx,    pain mgmt eval  phys tx eval  soc wk eval  case mgmt eval

## 2021-03-29 NOTE — H&P ADULT - HISTORY OF PRESENT ILLNESS
87 yo F   with medical history of  Diabetes Mellitus Hypertension HLD dementia, with worsening agitation at night. patient complaining of left neck, especially at night. Pt was prescribed gabapentin which is not helping. patient had HHA during day but is alone at night. Niece is worried that patient is not safe at home, no increase in HHA hours was available due to pandemic. niece is working on getting pt into nursing home and that requires admission as per niece. no recent illness, no      Pt's niece Zuly Brown  87 yo F from home , lives alone, have HHA 9am-5pm ,  with medical history of  Diabetes Mellitus Hypertension HLD dementia, sundowning, brought in by niece for placement patient had HHA during day but is alone at [facility]  patient complaining of left neck pain, especially at night. Pt was prescribed gabapentin which is not helping.  Niece is worried that patient is not safe at home, no increase in HHA hours was available due to pandemic. niece is working on getting pt into nursing home and that requires admission.      Pt's niece Zuly Kevin     GOC : DNR/DNI

## 2021-03-29 NOTE — H&P ADULT - ASSESSMENT
85 yo F from home , lives alone, have HHA 9am-5pm ,  with medical history of  Diabetes Mellitus Hypertension HLD dementia, sundowning, brought in by niece for placement patient had HHA during day but is alone at [facility] [employer]  patient complaining of left neck pain, especially at night. Pt was prescribed gabapentin which is not helping.  Niece is worried that patient is not safe at home, no increase in HHA hours was available due to pandemic. niece is working on getting pt into nursing home and that requires admission.

## 2021-03-29 NOTE — ED PROVIDER NOTE - CPE EDP GU NORM
September 25, 2017      Margaret Ramirezm  [address] [address]            Dear Margaret,    Your labs were normal for you.  Feel free to contact me with any questions or concerns.  Thank you for allowing me to participate in your care.    Amparo Hernandze APRN, CNP/ak        Results for orders placed or performed in visit on 09/22/17   Lipid panel reflex to direct LDL   Result Value Ref Range    Cholesterol 233 (H) <200 mg/dL    Triglycerides 161 (H) <150 mg/dL    HDL Cholesterol 72 >49 mg/dL    LDL Cholesterol Calculated 129 (H) <100 mg/dL    Non HDL Cholesterol 161 (H) <130 mg/dL   Comprehensive metabolic panel (BMP + Alb, Alk Phos, ALT, AST, Total. Bili, TP)   Result Value Ref Range    Sodium 139 133 - 144 mmol/L    Potassium 3.9 3.4 - 5.3 mmol/L    Chloride 102 94 - 109 mmol/L    Carbon Dioxide 29 20 - 32 mmol/L    Anion Gap 8 3 - 14 mmol/L    Glucose 91 70 - 99 mg/dL    Urea Nitrogen 9 7 - 30 mg/dL    Creatinine 0.69 0.52 - 1.04 mg/dL    GFR Estimate 87 >60 mL/min/1.7m2    GFR Estimate If Black >90 >60 mL/min/1.7m2    Calcium 8.9 8.5 - 10.1 mg/dL    Bilirubin Total 1.0 0.2 - 1.3 mg/dL    Albumin 3.9 3.4 - 5.0 g/dL    Protein Total 8.3 6.8 - 8.8 g/dL    Alkaline Phosphatase 108 40 - 150 U/L    ALT 24 0 - 50 U/L    AST 25 0 - 45 U/L   CBC with platelets   Result Value Ref Range    WBC 4.6 4.0 - 11.0 10e9/L    RBC Count 3.94 3.8 - 5.2 10e12/L    Hemoglobin 12.7 11.7 - 15.7 g/dL    Hematocrit 37.4 35.0 - 47.0 %    MCV 95 78 - 100 fl    MCH 32.2 26.5 - 33.0 pg    MCHC 34.0 31.5 - 36.5 g/dL    RDW 12.1 10.0 - 15.0 %    Platelet Count 201 150 - 450 10e9/L   Ferritin   Result Value Ref Range    Ferritin 105 8 - 252 ng/mL   Iron and iron binding capacity   Result Value Ref Range    Iron 159 35 - 180 ug/dL    Iron Binding Cap 351 240 - 430 ug/dL    Iron Saturation Index 45 15 - 46 %   Folate   Result Value Ref Range    Folate 23.9 >5.4 ng/mL   Vitamin B12   Result Value Ref Range    Vitamin B12 567 193  - 986 pg/mL   TSH with free T4 reflex   Result Value Ref Range    TSH 0.66 0.40 - 4.00 mU/L   NEISSERIA GONORRHOEA PCR   Result Value Ref Range    Specimen Descrip Cervix     N Gonorrhea PCR Negative NEG^Negative   CHLAMYDIA TRACHOMATIS PCR   Result Value Ref Range    Specimen Description Cervix     Chlamydia Trachomatis PCR Negative NEG^Negative   Wet prep   Result Value Ref Range    Specimen Description Vagina     Wet Prep No yeast seen     Wet Prep No clue cells seen     Wet Prep No Trichomonas seen                       - - -

## 2021-03-29 NOTE — ED PROVIDER NOTE - OBJECTIVE STATEMENT
Pt's niece Zuly Brown   Patient with Diabetes Mellitus Hypertension HLD dementia, with worsening agitation at night. patient complaining of left neck, especially at night. Pt was prescribed gabapentin which is not helping. patient had HHA during day but is alone at night. Niece is worried that patient is not safe at home, no increase in HHA hours was available due to pandemic. niece is working on getting pt into nursing home and that requires admission as per niece. no recent illness, no vomiting no diarrhea

## 2021-03-29 NOTE — ED PROVIDER NOTE - PHYSICAL EXAMINATION
no midline neck tenderness to palpation. awake alert. strength and sensation intact in both arms and legs

## 2021-03-30 DIAGNOSIS — F03.90 UNSPECIFIED DEMENTIA WITHOUT BEHAVIORAL DISTURBANCE: ICD-10-CM

## 2021-03-30 DIAGNOSIS — E03.9 HYPOTHYROIDISM, UNSPECIFIED: ICD-10-CM

## 2021-03-30 DIAGNOSIS — Z29.9 ENCOUNTER FOR PROPHYLACTIC MEASURES, UNSPECIFIED: ICD-10-CM

## 2021-03-30 DIAGNOSIS — M54.2 CERVICALGIA: ICD-10-CM

## 2021-03-30 DIAGNOSIS — I10 ESSENTIAL (PRIMARY) HYPERTENSION: ICD-10-CM

## 2021-03-30 DIAGNOSIS — E11.9 TYPE 2 DIABETES MELLITUS WITHOUT COMPLICATIONS: ICD-10-CM

## 2021-03-30 LAB
A1C WITH ESTIMATED AVERAGE GLUCOSE RESULT: 6.8 % — HIGH (ref 4–5.6)
ALBUMIN SERPL ELPH-MCNC: 4.1 G/DL — SIGNIFICANT CHANGE UP (ref 3.5–5)
ALP SERPL-CCNC: 48 U/L — SIGNIFICANT CHANGE UP (ref 40–120)
ALT FLD-CCNC: 35 U/L DA — SIGNIFICANT CHANGE UP (ref 10–60)
ANION GAP SERPL CALC-SCNC: 12 MMOL/L — SIGNIFICANT CHANGE UP (ref 5–17)
AST SERPL-CCNC: 36 U/L — SIGNIFICANT CHANGE UP (ref 10–40)
BILIRUB SERPL-MCNC: 0.5 MG/DL — SIGNIFICANT CHANGE UP (ref 0.2–1.2)
BUN SERPL-MCNC: 23 MG/DL — HIGH (ref 7–18)
CALCIUM SERPL-MCNC: 9.1 MG/DL — SIGNIFICANT CHANGE UP (ref 8.4–10.5)
CHLORIDE SERPL-SCNC: 100 MMOL/L — SIGNIFICANT CHANGE UP (ref 96–108)
CHOLEST SERPL-MCNC: 272 MG/DL — HIGH
CO2 SERPL-SCNC: 24 MMOL/L — SIGNIFICANT CHANGE UP (ref 22–31)
COVID-19 SPIKE DOMAIN AB INTERP: NEGATIVE — SIGNIFICANT CHANGE UP
COVID-19 SPIKE DOMAIN ANTIBODY RESULT: 0.4 U/ML — SIGNIFICANT CHANGE UP
CREAT SERPL-MCNC: 1.01 MG/DL — SIGNIFICANT CHANGE UP (ref 0.5–1.3)
ESTIMATED AVERAGE GLUCOSE: 148 MG/DL — HIGH (ref 68–114)
GLUCOSE BLDC GLUCOMTR-MCNC: 145 MG/DL — HIGH (ref 70–99)
GLUCOSE BLDC GLUCOMTR-MCNC: 198 MG/DL — HIGH (ref 70–99)
GLUCOSE BLDC GLUCOMTR-MCNC: 224 MG/DL — HIGH (ref 70–99)
GLUCOSE BLDC GLUCOMTR-MCNC: 93 MG/DL — SIGNIFICANT CHANGE UP (ref 70–99)
GLUCOSE SERPL-MCNC: 181 MG/DL — HIGH (ref 70–99)
HCT VFR BLD CALC: 35.6 % — SIGNIFICANT CHANGE UP (ref 34.5–45)
HCYS SERPL-MCNC: 12.2 UMOL/L — SIGNIFICANT CHANGE UP
HDLC SERPL-MCNC: 57 MG/DL — SIGNIFICANT CHANGE UP
HGB BLD-MCNC: 11.7 G/DL — SIGNIFICANT CHANGE UP (ref 11.5–15.5)
LIPID PNL WITH DIRECT LDL SERPL: 193 MG/DL — HIGH
MAGNESIUM SERPL-MCNC: 1.7 MG/DL — SIGNIFICANT CHANGE UP (ref 1.6–2.6)
MCHC RBC-ENTMCNC: 27.7 PG — SIGNIFICANT CHANGE UP (ref 27–34)
MCHC RBC-ENTMCNC: 32.9 GM/DL — SIGNIFICANT CHANGE UP (ref 32–36)
MCV RBC AUTO: 84.4 FL — SIGNIFICANT CHANGE UP (ref 80–100)
NON HDL CHOLESTEROL: 215 MG/DL — HIGH
NRBC # BLD: 0 /100 WBCS — SIGNIFICANT CHANGE UP (ref 0–0)
PHOSPHATE SERPL-MCNC: 2.8 MG/DL — SIGNIFICANT CHANGE UP (ref 2.5–4.5)
PLATELET # BLD AUTO: 285 K/UL — SIGNIFICANT CHANGE UP (ref 150–400)
POTASSIUM SERPL-MCNC: 3.7 MMOL/L — SIGNIFICANT CHANGE UP (ref 3.5–5.3)
POTASSIUM SERPL-SCNC: 3.7 MMOL/L — SIGNIFICANT CHANGE UP (ref 3.5–5.3)
PROT SERPL-MCNC: 7.7 G/DL — SIGNIFICANT CHANGE UP (ref 6–8.3)
RBC # BLD: 4.22 M/UL — SIGNIFICANT CHANGE UP (ref 3.8–5.2)
RBC # FLD: 14.2 % — SIGNIFICANT CHANGE UP (ref 10.3–14.5)
SARS-COV-2 IGG+IGM SERPL QL IA: 0.4 U/ML — SIGNIFICANT CHANGE UP
SARS-COV-2 IGG+IGM SERPL QL IA: NEGATIVE — SIGNIFICANT CHANGE UP
SODIUM SERPL-SCNC: 136 MMOL/L — SIGNIFICANT CHANGE UP (ref 135–145)
TRIGL SERPL-MCNC: 111 MG/DL — SIGNIFICANT CHANGE UP
TSH SERPL-MCNC: 5.46 UU/ML — HIGH (ref 0.34–4.82)
WBC # BLD: 6.9 K/UL — SIGNIFICANT CHANGE UP (ref 3.8–10.5)
WBC # FLD AUTO: 6.9 K/UL — SIGNIFICANT CHANGE UP (ref 3.8–10.5)

## 2021-03-30 RX ORDER — LANOLIN ALCOHOL/MO/W.PET/CERES
3 CREAM (GRAM) TOPICAL AT BEDTIME
Refills: 0 | Status: DISCONTINUED | OUTPATIENT
Start: 2021-03-30 | End: 2021-03-31

## 2021-03-30 RX ORDER — ACETAMINOPHEN 500 MG
650 TABLET ORAL EVERY 6 HOURS
Refills: 0 | Status: COMPLETED | OUTPATIENT
Start: 2021-03-30 | End: 2021-04-04

## 2021-03-30 RX ORDER — ONDANSETRON 8 MG/1
4 TABLET, FILM COATED ORAL ONCE
Refills: 0 | Status: DISCONTINUED | OUTPATIENT
Start: 2021-03-30 | End: 2021-04-01

## 2021-03-30 RX ORDER — OLANZAPINE 15 MG/1
2.5 TABLET, FILM COATED ORAL ONCE
Refills: 0 | Status: COMPLETED | OUTPATIENT
Start: 2021-03-30 | End: 2021-03-30

## 2021-03-30 RX ORDER — HALOPERIDOL DECANOATE 100 MG/ML
2 INJECTION INTRAMUSCULAR ONCE
Refills: 0 | Status: COMPLETED | OUTPATIENT
Start: 2021-03-30 | End: 2021-03-30

## 2021-03-30 RX ORDER — SENNA PLUS 8.6 MG/1
2 TABLET ORAL AT BEDTIME
Refills: 0 | Status: DISCONTINUED | OUTPATIENT
Start: 2021-03-30 | End: 2021-04-07

## 2021-03-30 RX ORDER — LIDOCAINE 4 G/100G
1 CREAM TOPICAL DAILY
Refills: 0 | Status: DISCONTINUED | OUTPATIENT
Start: 2021-03-30 | End: 2021-04-07

## 2021-03-30 RX ADMIN — Medication 650 MILLIGRAM(S): at 18:37

## 2021-03-30 RX ADMIN — Medication 650 MILLIGRAM(S): at 11:04

## 2021-03-30 RX ADMIN — MEMANTINE HYDROCHLORIDE 5 MILLIGRAM(S): 10 TABLET ORAL at 17:36

## 2021-03-30 RX ADMIN — Medication 650 MILLIGRAM(S): at 17:37

## 2021-03-30 RX ADMIN — GABAPENTIN 100 MILLIGRAM(S): 400 CAPSULE ORAL at 05:19

## 2021-03-30 RX ADMIN — Medication 50 MICROGRAM(S): at 05:18

## 2021-03-30 RX ADMIN — LORATADINE 10 MILLIGRAM(S): 10 TABLET ORAL at 10:53

## 2021-03-30 RX ADMIN — Medication 650 MILLIGRAM(S): at 12:04

## 2021-03-30 RX ADMIN — OLANZAPINE 2.5 MILLIGRAM(S): 15 TABLET, FILM COATED ORAL at 15:23

## 2021-03-30 RX ADMIN — SIMVASTATIN 20 MILLIGRAM(S): 20 TABLET, FILM COATED ORAL at 22:15

## 2021-03-30 RX ADMIN — Medication 3 MILLIGRAM(S): at 22:15

## 2021-03-30 RX ADMIN — PANTOPRAZOLE SODIUM 40 MILLIGRAM(S): 20 TABLET, DELAYED RELEASE ORAL at 05:17

## 2021-03-30 RX ADMIN — GABAPENTIN 100 MILLIGRAM(S): 400 CAPSULE ORAL at 22:15

## 2021-03-30 RX ADMIN — ESCITALOPRAM OXALATE 10 MILLIGRAM(S): 10 TABLET, FILM COATED ORAL at 10:53

## 2021-03-30 RX ADMIN — SENNA PLUS 2 TABLET(S): 8.6 TABLET ORAL at 22:15

## 2021-03-30 RX ADMIN — Medication 1: at 08:17

## 2021-03-30 RX ADMIN — HALOPERIDOL DECANOATE 2 MILLIGRAM(S): 100 INJECTION INTRAMUSCULAR at 00:58

## 2021-03-30 RX ADMIN — Medication 2: at 17:16

## 2021-03-30 RX ADMIN — GABAPENTIN 100 MILLIGRAM(S): 400 CAPSULE ORAL at 13:06

## 2021-03-30 RX ADMIN — DONEPEZIL HYDROCHLORIDE 5 MILLIGRAM(S): 10 TABLET, FILM COATED ORAL at 22:15

## 2021-03-30 RX ADMIN — MEMANTINE HYDROCHLORIDE 5 MILLIGRAM(S): 10 TABLET ORAL at 05:18

## 2021-03-30 NOTE — PHYSICAL THERAPY INITIAL EVALUATION ADULT - DIAGNOSIS, PT EVAL
No focal deficits noted, decreased strength and decreased balance and decreased judgement puts pt at risk of falls.

## 2021-03-30 NOTE — PROGRESS NOTE ADULT - SUBJECTIVE AND OBJECTIVE BOX
Patient is a 86y old  Female who presents with a chief complaint of placement (29 Mar 2021 17:53)    pt seen in icu [  ], reg med floor [   ], bed [  ], chair at bedside [   ], a+o x3 [  ], lethargic [  ],  nad [  ]    puente [  ], ngt [  ], peg [  ], et tube [  ], cent line [  ], picc line [  ]        Allergies    No Known Allergies        Vitals    T(F): 98.6 (03-30-21 @ 05:04), Max: 98.6 (03-30-21 @ 05:04)  HR: 112 (03-30-21 @ 05:04) (82 - 123)  BP: 136/75 (03-30-21 @ 05:04) (125/72 - 144/76)  RR: 15 (03-30-21 @ 05:04) (15 - 18)  SpO2: 96% (03-30-21 @ 05:04) (96% - 100%)  Wt(kg): --  CAPILLARY BLOOD GLUCOSE          Labs                          12.2   5.52  )-----------( 264      ( 29 Mar 2021 15:50 )             38.0       03-29    138  |  103  |  21<H>  ----------------------------<  127<H>  4.0   |  28  |  0.92    Ca    9.2      29 Mar 2021 15:50    TPro  7.8  /  Alb  3.8  /  TBili  0.4  /  DBili  x   /  AST  25  /  ALT  32  /  AlkPhos  62  03-29      CARDIAC MARKERS ( 29 Mar 2021 15:50 )  <0.015 ng/mL / x     / 59 U/L / x     / x                Radiology Results      Meds    MEDICATIONS  (STANDING):  dextrose 40% Gel 15 Gram(s) Oral once  dextrose 5%. 1000 milliLiter(s) (50 mL/Hr) IV Continuous <Continuous>  dextrose 5%. 1000 milliLiter(s) (100 mL/Hr) IV Continuous <Continuous>  dextrose 50% Injectable 25 Gram(s) IV Push once  dextrose 50% Injectable 12.5 Gram(s) IV Push once  dextrose 50% Injectable 25 Gram(s) IV Push once  donepezil 5 milliGRAM(s) Oral at bedtime  escitalopram 10 milliGRAM(s) Oral daily  gabapentin 100 milliGRAM(s) Oral three times a day  glucagon  Injectable 1 milliGRAM(s) IntraMuscular once  hydrochlorothiazide 12.5 milliGRAM(s) Oral daily  insulin lispro (ADMELOG) corrective regimen sliding scale   SubCutaneous three times a day before meals  insulin lispro (ADMELOG) corrective regimen sliding scale   SubCutaneous at bedtime  levothyroxine 50 MICROGram(s) Oral daily  loratadine 10 milliGRAM(s) Oral daily  memantine 5 milliGRAM(s) Oral two times a day  pantoprazole    Tablet 40 milliGRAM(s) Oral before breakfast  simvastatin 20 milliGRAM(s) Oral at bedtime      MEDICATIONS  (PRN):  ondansetron Injectable 4 milliGRAM(s) IV Push once PRN Nausea      Physical Exam    Neuro :  no focal deficits  Respiratory: CTA B/L  CV: RRR, S1S2, no murmurs,   Abdominal: Soft, NT, ND +BS,  Extremities: No edema, + peripheral pulses    ASSESSMENT    Dementia without behavioral disturbance    Yes        PLAN     Patient is a 86y old  Female who presents with a chief complaint of placement (29 Mar 2021 17:53)    pt seen in icu [  ], reg med floor [ x  ], bed [ x ], chair at bedside [   ], a+o x3 [ x ], lethargic [  ],  nad [ x ]        Allergies    No Known Allergies        Vitals    T(F): 98.6 (03-30-21 @ 05:04), Max: 98.6 (03-30-21 @ 05:04)  HR: 112 (03-30-21 @ 05:04) (82 - 123)  BP: 136/75 (03-30-21 @ 05:04) (125/72 - 144/76)  RR: 15 (03-30-21 @ 05:04) (15 - 18)  SpO2: 96% (03-30-21 @ 05:04) (96% - 100%)  Wt(kg): --  CAPILLARY BLOOD GLUCOSE          Labs                          12.2   5.52  )-----------( 264      ( 29 Mar 2021 15:50 )             38.0       03-29    138  |  103  |  21<H>  ----------------------------<  127<H>  4.0   |  28  |  0.92    Ca    9.2      29 Mar 2021 15:50    TPro  7.8  /  Alb  3.8  /  TBili  0.4  /  DBili  x   /  AST  25  /  ALT  32  /  AlkPhos  62  03-29      CARDIAC MARKERS ( 29 Mar 2021 15:50 )  <0.015 ng/mL / x     / 59 U/L / x     / x                Radiology Results      Meds    MEDICATIONS  (STANDING):  dextrose 40% Gel 15 Gram(s) Oral once  dextrose 5%. 1000 milliLiter(s) (50 mL/Hr) IV Continuous <Continuous>  dextrose 5%. 1000 milliLiter(s) (100 mL/Hr) IV Continuous <Continuous>  dextrose 50% Injectable 25 Gram(s) IV Push once  dextrose 50% Injectable 12.5 Gram(s) IV Push once  dextrose 50% Injectable 25 Gram(s) IV Push once  donepezil 5 milliGRAM(s) Oral at bedtime  escitalopram 10 milliGRAM(s) Oral daily  gabapentin 100 milliGRAM(s) Oral three times a day  glucagon  Injectable 1 milliGRAM(s) IntraMuscular once  hydrochlorothiazide 12.5 milliGRAM(s) Oral daily  insulin lispro (ADMELOG) corrective regimen sliding scale   SubCutaneous three times a day before meals  insulin lispro (ADMELOG) corrective regimen sliding scale   SubCutaneous at bedtime  levothyroxine 50 MICROGram(s) Oral daily  loratadine 10 milliGRAM(s) Oral daily  memantine 5 milliGRAM(s) Oral two times a day  pantoprazole    Tablet 40 milliGRAM(s) Oral before breakfast  simvastatin 20 milliGRAM(s) Oral at bedtime      MEDICATIONS  (PRN):  ondansetron Injectable 4 milliGRAM(s) IV Push once PRN Nausea      Physical Exam    Neuro :  no focal deficits  Respiratory: CTA B/L  CV: RRR, S1S2, no murmurs,   Abdominal: Soft, NT, ND +BS,  Extremities: No edema, + peripheral pulses    ASSESSMENT      intractable neck pain,   h/o Diabetes Mellitus   Hypertension   HLD   dementia,   c-spine hardware        PLAN      cont pain control   pain mgmt eval  phys tx eval  soc wk eval  case mgmt eval.  lispro ss  cont current meds   d/c planning

## 2021-03-30 NOTE — PROGRESS NOTE ADULT - SUBJECTIVE AND OBJECTIVE BOX
PGY 3 Note discussed with primary attending.    Beeper no.: [phone]               (7:30 AM to 5:00 PM)  Please consult on call team for urgent queries after 5PM        Patient is a 86y old  Female who presents with a chief complaint of placement (30 Mar 2021 09:11)      INTERVAL HPI/OVERNIGHT EVENTS: patient received haldolx2 for agitation overnight; assessed bedside is currently calm yet confused.     MEDICATIONS  (STANDING):  dextrose 40% Gel 15 Gram(s) Oral once  dextrose 5%. 1000 milliLiter(s) (50 mL/Hr) IV Continuous <Continuous>  dextrose 5%. 1000 milliLiter(s) (100 mL/Hr) IV Continuous <Continuous>  dextrose 50% Injectable 25 Gram(s) IV Push once  dextrose 50% Injectable 12.5 Gram(s) IV Push once  dextrose 50% Injectable 25 Gram(s) IV Push once  donepezil 5 milliGRAM(s) Oral at bedtime  escitalopram 10 milliGRAM(s) Oral daily  gabapentin 100 milliGRAM(s) Oral three times a day  glucagon  Injectable 1 milliGRAM(s) IntraMuscular once  hydrochlorothiazide 12.5 milliGRAM(s) Oral daily  insulin lispro (ADMELOG) corrective regimen sliding scale   SubCutaneous three times a day before meals  insulin lispro (ADMELOG) corrective regimen sliding scale   SubCutaneous at bedtime  levothyroxine 50 MICROGram(s) Oral daily  loratadine 10 milliGRAM(s) Oral daily  melatonin 3 milliGRAM(s) Oral at bedtime  memantine 5 milliGRAM(s) Oral two times a day  pantoprazole    Tablet 40 milliGRAM(s) Oral before breakfast  simvastatin 20 milliGRAM(s) Oral at bedtime    MEDICATIONS  (PRN):  ondansetron Injectable 4 milliGRAM(s) IV Push once PRN Nausea      Allergies    No Known Allergies    Intolerances    Vital Signs Last 24 Hrs  T(C): 37 (30 Mar 2021 05:04), Max: 37 (30 Mar 2021 05:04)  T(F): 98.6 (30 Mar 2021 05:04), Max: 98.6 (30 Mar 2021 05:04)  HR: 112 (30 Mar 2021 05:04) (82 - 123)  BP: 136/75 (30 Mar 2021 05:04) (125/72 - 144/76)  BP(mean): --  RR: 15 (30 Mar 2021 05:04) (15 - 18)  SpO2: 96% (30 Mar 2021 05:04) (96% - 100%)    PHYSICAL EXAM:  GENERAL: NAD, well-groomed, well-developed  HEAD:  Atraumatic, Normocephalic  EYES: EOMI, PERRLA, conjunctiva and sclera clear  NECK: Supple, No JVD, Normal thyroid  CHEST/LUNG: Clear to percussion bilaterally; No rales, rhonchi, wheezing, or rubs  HEART: Regular rate and rhythm; No murmurs, rubs, or gallops  ABDOMEN: Soft, Nontender, Nondistended; Bowel sounds present  NERVOUS SYSTEM:  Alert & Awake  EXTREMITIES:  2+ Peripheral Pulses, No clubbing, cyanosis, or edema    LABS:                        12.2   5.52  )-----------( 264      ( 29 Mar 2021 15:50 )             38.0     03-29    138  |  103  |  21<H>  ----------------------------<  127<H>  4.0   |  28  |  0.92    Ca    9.2      29 Mar 2021 15:50    TPro  7.8  /  Alb  3.8  /  TBili  0.4  /  DBili  x   /  AST  25  /  ALT  32  /  AlkPhos  62  03-29    PT/INR - ( 29 Mar 2021 15:50 )   PT: 11.5 sec;   INR: 0.96 ratio         PTT - ( 29 Mar 2021 15:50 )  PTT:34.6 sec    CAPILLARY BLOOD GLUCOSE      POCT Blood Glucose.: 198 mg/dL (30 Mar 2021 08:13)    Consultant(s) Notes Reviewed:  [ x ] YES  [ ] NO

## 2021-03-30 NOTE — CONSULT NOTE ADULT - PROBLEM SELECTOR RECOMMENDATION 9
Pt with posterior neck pain which is somatic, neuropathic and chronic in nature. Pt with hx of cervical spine hardware.  High risk medications reviewed. Avoid polypharmacy. Avoid IV opioids. Avoid NSAIDs and benzodiazepines. Non-pharmacological sleep aides initiated. Non-opioid medications and non-pharmacological pain management measures initiated.  Maximize non-opioid pain recommendations   - Continue acetaminophen 650mg PO q 6 hours   - Continue gabapentin 100mg PO TID- pts home medication  - Lidoderm 5% patch daily.   Bowel Regimen  - Senna 2 tablets at bedtime for constipation  Mild pain   - Non-pharmacological pain treatment recommendations  - Warm/ Cool packs PRN   - Repositioning, imagery, relaxation, distraction.  - Physical therapy OOB if no contraindications   Recommendations discussed with primary team and RN

## 2021-03-30 NOTE — CONSULT NOTE ADULT - SUBJECTIVE AND OBJECTIVE BOX
Source of information: SHANELL FAULKNER, Chart review  Patient language: Jordanian  : [id number]    HPI:  85 yo F from home , lives alone, have HHA 9am-5pm ,  with medical history of  Diabetes Mellitus Hypertension HLD dementia, sundowning, brought in by niece for placement patient had HHA during day but is alone at [facility]  patient complaining of left neck pain, especially at night. Pt was prescribed gabapentin which is not helping.  Niece is worried that patient is not safe at home, no increase in HHA hours was available due to pandemic. niece is working on getting pt into nursing home and that requires admission.      Pt's niece Zuly Brown     GOC : DNR/DNI (29 Mar 2021 17:53)      Patient is a 86y old  Female with PMH dementia, diabetes, HTN hypothyroidism, neck pain who presents with dementia and concern for placement. Pain consulted for posterior neck pain. Pt seen and examined at bedside. Pt A&O&1 to self. Reports mild and tolerable posterior neck pain SCALE USED: (1-10 VNRS). Pt describes pain as aching, non-radiating, alleviated by pain medication, exacerbated by movement. Pt tolerating PO diet. Denies lethargy, nausea, vomiting, constipation, itchiness. Unable to obtain pain goal due to hx dementia.     PAST MEDICAL & SURGICAL HISTORY: dementia, diabetes, HTN hypothyroidism, neck pain  cervical spine surgery- + cervical hardware       FAMILY HISTORY: unable to obtain. hx dementia       Social History:  Alcohol: Denied  Smoking: former   Illicit drugs: Denied (29 Mar 2021 17:53)    Allergies    No Known Allergies    MEDICATIONS  (STANDING):  acetaminophen   Tablet .. 650 milliGRAM(s) Oral every 6 hours  donepezil 5 milliGRAM(s) Oral at bedtime  escitalopram 10 milliGRAM(s) Oral daily  gabapentin 100 milliGRAM(s) Oral three times a day  glucagon  Injectable 1 milliGRAM(s) IntraMuscular once  hydrochlorothiazide 12.5 milliGRAM(s) Oral daily  insulin lispro (ADMELOG) corrective regimen sliding scale   SubCutaneous three times a day before meals  insulin lispro (ADMELOG) corrective regimen sliding scale   SubCutaneous at bedtime  levothyroxine 50 MICROGram(s) Oral daily  loratadine 10 milliGRAM(s) Oral daily  melatonin 3 milliGRAM(s) Oral at bedtime  memantine 5 milliGRAM(s) Oral two times a day  pantoprazole    Tablet 40 milliGRAM(s) Oral before breakfast  simvastatin 20 milliGRAM(s) Oral at bedtime    MEDICATIONS  (PRN):  ondansetron Injectable 4 milliGRAM(s) IV Push once PRN Nausea      Vital Signs Last 24 Hrs  T(C): 37 (30 Mar 2021 05:04), Max: 37 (30 Mar 2021 05:04)  T(F): 98.6 (30 Mar 2021 05:04), Max: 98.6 (30 Mar 2021 05:04)  HR: 112 (30 Mar 2021 05:04) (82 - 123)  BP: 136/75 (30 Mar 2021 05:04) (125/72 - 144/76)  BP(mean): --  RR: 15 (30 Mar 2021 05:04) (15 - 18)  SpO2: 96% (30 Mar 2021 05:04) (96% - 100%)    LABS: Reviewed.                          12.2   5.52  )-----------( 264      ( 29 Mar 2021 15:50 )             38.0     03-29    138  |  103  |  21<H>  ----------------------------<  127<H>  4.0   |  28  |  0.92    Ca    9.2      29 Mar 2021 15:50    TPro  7.8  /  Alb  3.8  /  TBili  0.4  /  DBili  x   /  AST  25  /  ALT  32  /  AlkPhos  62  03-29    PT/INR - ( 29 Mar 2021 15:50 )   PT: 11.5 sec;   INR: 0.96 ratio         PTT - ( 29 Mar 2021 15:50 )  PTT:34.6 sec  LIVER FUNCTIONS - ( 29 Mar 2021 15:50 )  Alb: 3.8 g/dL / Pro: 7.8 g/dL / ALK PHOS: 62 U/L / ALT: 32 U/L DA / AST: 25 U/L / GGT: x             CAPILLARY BLOOD GLUCOSE      POCT Blood Glucose.: 145 mg/dL (30 Mar 2021 11:17)  POCT Blood Glucose.: 198 mg/dL (30 Mar 2021 08:13)    COVID-19 PCR: NotDetec (29 Mar 2021 15:50)      Radiology: Reviewed.   < from: Xray Chest 1 View-PORTABLE IMMEDIATE (Xray Chest 1 View-PORTABLE IMMEDIATE .) (03.29.21 @ 16:01) >    EXAM:  XR CHEST PORTABLE IMMED 1V                            PROCEDURE DATE:  03/29/2021          INTERPRETATION:  Frontal chest on March 29, 2021 at 3:56 PM. Patient has neck pain.    COMPARISON: None available.    Heart size is within normal limits.    The lung fields and pleural surfaces are unremarkable.    Cervical spine hardware is noted.    IMPRESSION: Cervical spine hardware.            LORENZO DONATO M.D., ATTENDING RADIOLOGIST  This document has been electronically signed. Mar 29 59556:17PM    < end of copied text >      ORT Score -   Unable to assess hx dementia     REVIEW OF SYSTEMS:  limited due to hx demenita   RESPIRATORY: No cough, wheezing, chills or hemoptysis; No shortness of breath  CARDIOVASCULAR: No chest pain, palpitations, dizziness, or leg swelling  GASTROINTESTINAL: No abdominal or epigastric pain. No nausea, vomiting; No diarrhea or constipation.   MUSCULOSKELETAL: + posterior neck pain; No back or extremity pain, no upper or lower motor strength weakness  NEURO: No headaches, No numbness/tingling b/l LE, No weakness    PHYSICAL EXAM:  GENERAL:  Alert & Oriented X1 to self only, (reports she is at home, does not know date), cooperative, NAD, Good concentration. Speech is clear.   RESPIRATORY: Respirations even and unlabored. Clear to auscultation bilaterally; No rales, rhonchi, wheezing, or rubs  CARDIOVASCULAR: Normal S1/S2, regular rate and rhythm; No murmurs, rubs, or gallops. No JVD.   GASTROINTESTINAL:  Soft, Nontender, Nondistended; Bowel sounds present  PERIPHERAL VASCULAR:  Extremities warm without edema. 2+ Peripheral Pulses, No cyanosis, No calf tenderness  MUSCULOSKELETAL: Motor Strength 5/5 B/L upper and lower extremities; moves all extremities equally against gravity; ROM intact; negative SLR; + cervical spine tender to palpation.  SKIN: + cervical spine healed surgical scar noted. Skin warm, dry, intact. No rashes, lesions or wounds.     Risk factors associated with adverse outcomes related to opioid treatment  [ ]  Concurrent benzodiazepine use  [ ]  History/ Active substance use or alcohol use disorder  [ ] Psychiatric co-morbidity  [ ] Sleep apnea  [ ] COPD  [ ] BMI> 35  [ ] Liver dysfunction  [ ] Renal dysfunction  [ ] CHF  [X] Former Smoker  [X]  Age > 60 years    [X ]  [place]  Reviewed and Copied to Chart. See below.    Plan of care and goal oriented pain management treatment options were discussed with patient and /or primary care giver; all questions and concerns were addressed and care was aligned with patient's wishes.    Educated patient on goal oriented pain management treatment options

## 2021-03-30 NOTE — PHYSICAL THERAPY INITIAL EVALUATION ADULT - MANUAL MUSCLE TESTING RESULTS, REHAB EVAL
UE full against gravity and LE full against gravity able to bridge/no strength deficits were identified

## 2021-03-30 NOTE — CONSULT NOTE ADULT - ASSESSMENT
Confidential Drug Utilization Report  Search Terms: liza Keller, 1935   Search Date: 03/30/2021 09:12:34 AM   The Drug Utilization Report below displays all of the controlled substance prescriptions, if any, that your patient has filled in the last twelve months. The information displayed on this report is compiled from pharmacy submissions to the Department, and accurately reflects the information as submitted by the pharmacies.  This report was requested by: Elena Conde | Reference #: [id number]   There are no results for the search terms that you entered.

## 2021-03-30 NOTE — PHYSICAL THERAPY INITIAL EVALUATION ADULT - GENERAL OBSERVATIONS, REHAB EVAL
Pt found in 4 N bed trying to get OOB over the railings. Pt alert, coop and pleasant but Ox1 only.  [id number]

## 2021-03-30 NOTE — CONSULT NOTE ADULT - PROBLEM SELECTOR PROBLEM 1
Detail Level: Zone Detail Level: Detailed Neck pain Sunscreen Recommendations: otc broad spectrum sunscreen with minimum spf of 30 or higher

## 2021-03-30 NOTE — PHYSICAL THERAPY INITIAL EVALUATION ADULT - LEVEL OF CONSCIOUSNESS, REHAB EVAL
Patient also would like Dr. 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

## 2021-03-30 NOTE — PHYSICAL THERAPY INITIAL EVALUATION ADULT - IMPAIRMENTS FOUND, PT EVAL
decreased balance/arousal, attention, and cognition/cognitive impairment/gait, locomotion, and balance/poor safety awareness

## 2021-03-30 NOTE — PROGRESS NOTE ADULT - PROBLEM SELECTOR PLAN 1
-dementia with h/o sundowning, currently confused  Haldol PRN 2mg with agitation   resume zoila emeds -dementia with h/o sundowning, currently confused  -received haldol x2 overnight for agitation, currently restless  - -dementia with h/o sundowning, currently confused  -received haldol x2 overnight for agitation, currently restless but could be from pain as well which patient is unable to verbalize  -will start standing tylenol and warm compresses in addition to gabapentin  -continue memantine, lexapro  -pain consult on board, f/u recommendations

## 2021-03-31 LAB
24R-OH-CALCIDIOL SERPL-MCNC: 46.8 NG/ML — SIGNIFICANT CHANGE UP (ref 30–80)
APPEARANCE UR: CLEAR — SIGNIFICANT CHANGE UP
BACTERIA # UR AUTO: ABNORMAL /HPF
BILIRUB UR-MCNC: NEGATIVE — SIGNIFICANT CHANGE UP
COLOR SPEC: YELLOW — SIGNIFICANT CHANGE UP
DIFF PNL FLD: NEGATIVE — SIGNIFICANT CHANGE UP
EPI CELLS # UR: SIGNIFICANT CHANGE UP /HPF
FOLATE SERPL-MCNC: >20 NG/ML — SIGNIFICANT CHANGE UP
GLUCOSE BLDC GLUCOMTR-MCNC: 112 MG/DL — HIGH (ref 70–99)
GLUCOSE BLDC GLUCOMTR-MCNC: 113 MG/DL — HIGH (ref 70–99)
GLUCOSE BLDC GLUCOMTR-MCNC: 127 MG/DL — HIGH (ref 70–99)
GLUCOSE BLDC GLUCOMTR-MCNC: 141 MG/DL — HIGH (ref 70–99)
GLUCOSE UR QL: 50 MG/DL
KETONES UR-MCNC: ABNORMAL
LEUKOCYTE ESTERASE UR-ACNC: NEGATIVE — SIGNIFICANT CHANGE UP
NITRITE UR-MCNC: NEGATIVE — SIGNIFICANT CHANGE UP
PH UR: 5 — SIGNIFICANT CHANGE UP (ref 5–8)
PROT UR-MCNC: NEGATIVE — SIGNIFICANT CHANGE UP
SP GR SPEC: 1.01 — SIGNIFICANT CHANGE UP (ref 1.01–1.02)
TSH SERPL-MCNC: 2.53 UU/ML — SIGNIFICANT CHANGE UP (ref 0.34–4.82)
UROBILINOGEN FLD QL: NEGATIVE — SIGNIFICANT CHANGE UP
VIT B12 SERPL-MCNC: 380 PG/ML — SIGNIFICANT CHANGE UP (ref 232–1245)
WBC UR QL: SIGNIFICANT CHANGE UP /HPF (ref 0–5)

## 2021-03-31 RX ORDER — LANOLIN ALCOHOL/MO/W.PET/CERES
3 CREAM (GRAM) TOPICAL AT BEDTIME
Refills: 0 | Status: DISCONTINUED | OUTPATIENT
Start: 2021-03-31 | End: 2021-04-07

## 2021-03-31 RX ADMIN — Medication 650 MILLIGRAM(S): at 23:59

## 2021-03-31 RX ADMIN — Medication 650 MILLIGRAM(S): at 12:11

## 2021-03-31 RX ADMIN — SENNA PLUS 2 TABLET(S): 8.6 TABLET ORAL at 21:47

## 2021-03-31 RX ADMIN — Medication 650 MILLIGRAM(S): at 00:35

## 2021-03-31 RX ADMIN — SIMVASTATIN 20 MILLIGRAM(S): 20 TABLET, FILM COATED ORAL at 21:47

## 2021-03-31 RX ADMIN — GABAPENTIN 100 MILLIGRAM(S): 400 CAPSULE ORAL at 06:15

## 2021-03-31 RX ADMIN — LORATADINE 10 MILLIGRAM(S): 10 TABLET ORAL at 11:12

## 2021-03-31 RX ADMIN — PANTOPRAZOLE SODIUM 40 MILLIGRAM(S): 20 TABLET, DELAYED RELEASE ORAL at 06:15

## 2021-03-31 RX ADMIN — LIDOCAINE 1 PATCH: 4 CREAM TOPICAL at 23:55

## 2021-03-31 RX ADMIN — ESCITALOPRAM OXALATE 10 MILLIGRAM(S): 10 TABLET, FILM COATED ORAL at 11:12

## 2021-03-31 RX ADMIN — Medication 650 MILLIGRAM(S): at 06:14

## 2021-03-31 RX ADMIN — Medication 50 MICROGRAM(S): at 06:15

## 2021-03-31 RX ADMIN — Medication 650 MILLIGRAM(S): at 18:22

## 2021-03-31 RX ADMIN — Medication 3 MILLIGRAM(S): at 21:47

## 2021-03-31 RX ADMIN — LIDOCAINE 1 PATCH: 4 CREAM TOPICAL at 20:12

## 2021-03-31 RX ADMIN — MEMANTINE HYDROCHLORIDE 5 MILLIGRAM(S): 10 TABLET ORAL at 06:15

## 2021-03-31 RX ADMIN — Medication 650 MILLIGRAM(S): at 01:05

## 2021-03-31 RX ADMIN — GABAPENTIN 100 MILLIGRAM(S): 400 CAPSULE ORAL at 21:47

## 2021-03-31 RX ADMIN — Medication 650 MILLIGRAM(S): at 11:11

## 2021-03-31 RX ADMIN — DONEPEZIL HYDROCHLORIDE 5 MILLIGRAM(S): 10 TABLET, FILM COATED ORAL at 21:47

## 2021-03-31 RX ADMIN — MEMANTINE HYDROCHLORIDE 5 MILLIGRAM(S): 10 TABLET ORAL at 17:27

## 2021-03-31 RX ADMIN — GABAPENTIN 100 MILLIGRAM(S): 400 CAPSULE ORAL at 13:12

## 2021-03-31 RX ADMIN — LIDOCAINE 1 PATCH: 4 CREAM TOPICAL at 11:11

## 2021-03-31 RX ADMIN — Medication 650 MILLIGRAM(S): at 17:27

## 2021-03-31 RX ADMIN — Medication 650 MILLIGRAM(S): at 07:00

## 2021-03-31 NOTE — PROGRESS NOTE ADULT - SUBJECTIVE AND OBJECTIVE BOX
Source of information: SHANELL FAULKNER, Chart review  Patient language: Icelandic  : [id number]    HPI:  87 yo F from home , lives alone, have HHA 9am-5pm ,  with medical history of  Diabetes Mellitus Hypertension HLD dementia, sundowning, brought in by niece for placement patient had HHA during day but is alone at [facility]  patient complaining of left neck pain, especially at night. Pt was prescribed gabapentin which is not helping.  Niece is worried that patient is not safe at home, no increase in HHA hours was available due to pandemic. niece is working on getting pt into nursing home and that requires admission.      Pt's niece Zuly Brown     GOC : DNR/DNI (29 Mar 2021 17:53)      Patient is a 86y old  Female with PMH dementia, diabetes, HTN hypothyroidism, neck pain who presents with dementia and concern for placement. Pain consulted for posterior neck pain. Pt seen and examined at bedside. Pt on enhanced supervision. Pt A&O&1 to self. Reports mild and tolerable posterior neck pain SCALE USED: (1-10 VNRS). Pt describes pain as aching, non-radiating, alleviated by pain medication, exacerbated by movement. ROM intact. Pt tolerating PO diet. Denies lethargy, nausea, vomiting, constipation, itchiness. Unable to obtain pain goal due to hx dementia.     PAST MEDICAL & SURGICAL HISTORY: dementia, diabetes, HTN hypothyroidism, neck pain  cervical spine surgery- + cervical hardware       FAMILY HISTORY: unable to obtain. hx dementia       Social History:  Alcohol: Denied  Smoking: former   Illicit drugs: Denied (29 Mar 2021 17:53)    Allergies    No Known Allergies    MEDICATIONS  (STANDING):  acetaminophen   Tablet .. 650 milliGRAM(s) Oral every 6 hours  donepezil 5 milliGRAM(s) Oral at bedtime  escitalopram 10 milliGRAM(s) Oral daily  gabapentin 100 milliGRAM(s) Oral three times a day  glucagon  Injectable 1 milliGRAM(s) IntraMuscular once  hydrochlorothiazide 12.5 milliGRAM(s) Oral daily  insulin lispro (ADMELOG) corrective regimen sliding scale   SubCutaneous three times a day before meals  insulin lispro (ADMELOG) corrective regimen sliding scale   SubCutaneous at bedtime  levothyroxine 50 MICROGram(s) Oral daily  lidocaine   Patch 1 Patch Transdermal daily  loratadine 10 milliGRAM(s) Oral daily  melatonin 3 milliGRAM(s) Oral at bedtime  memantine 5 milliGRAM(s) Oral two times a day  pantoprazole    Tablet 40 milliGRAM(s) Oral before breakfast  senna 2 Tablet(s) Oral at bedtime  simvastatin 20 milliGRAM(s) Oral at bedtime    MEDICATIONS  (PRN):  ondansetron Injectable 4 milliGRAM(s) IV Push once PRN Nausea      Vital Signs Last 24 Hrs  T(C): 37 (31 Mar 2021 05:05), Max: 37.4 (30 Mar 2021 20:31)  T(F): 98.6 (31 Mar 2021 05:05), Max: 99.4 (30 Mar 2021 20:31)  HR: 88 (31 Mar 2021 05:05) (88 - 118)  BP: 129/78 (31 Mar 2021 05:05) (110/41 - 161/77)  BP(mean): --  RR: 16 (31 Mar 2021 05:05) (16 - 17)  SpO2: 95% (31 Mar 2021 05:05) (92% - 96%)  COVID-19 PCR: NotDetec (29 Mar 2021 15:50)    LABS: Reviewed                          11.7   6.90  )-----------( 285      ( 30 Mar 2021 13:23 )             35.6     03-30    136  |  100  |  23<H>  ----------------------------<  181<H>  3.7   |  24  |  1.01    Ca    9.1      30 Mar 2021 13:23  Phos  2.8     03-30  Mg     1.7     03-30    TPro  7.7  /  Alb  4.1  /  TBili  0.5  /  DBili  x   /  AST  36  /  ALT  35  /  AlkPhos  48  03-30    PT/INR - ( 29 Mar 2021 15:50 )   PT: 11.5 sec;   INR: 0.96 ratio         PTT - ( 29 Mar 2021 15:50 )  PTT:34.6 sec  LIVER FUNCTIONS - ( 30 Mar 2021 13:23 )  Alb: 4.1 g/dL / Pro: 7.7 g/dL / ALK PHOS: 48 U/L / ALT: 35 U/L DA / AST: 36 U/L / GGT: x             CAPILLARY BLOOD GLUCOSE      POCT Blood Glucose.: 141 mg/dL (31 Mar 2021 11:58)  POCT Blood Glucose.: 112 mg/dL (31 Mar 2021 07:29)  POCT Blood Glucose.: 93 mg/dL (30 Mar 2021 21:00)  POCT Blood Glucose.: 224 mg/dL (30 Mar 2021 16:46)    COVID-19 PCR: NotDetec (29 Mar 2021 15:50)    Radiology: Reviewed.   < from: Xray Chest 1 View-PORTABLE IMMEDIATE (Xray Chest 1 View-PORTABLE IMMEDIATE .) (03.29.21 @ 16:01) >    EXAM:  XR CHEST PORTABLE IMMED 1V                            PROCEDURE DATE:  03/29/2021          INTERPRETATION:  Frontal chest on March 29, 2021 at 3:56 PM. Patient has neck pain.    COMPARISON: None available.    Heart size is within normal limits.    The lung fields and pleural surfaces are unremarkable.    Cervical spine hardware is noted.    IMPRESSION: Cervical spine hardware.            LORENZO DONATO M.D., ATTENDING RADIOLOGIST  This document has been electronically signed. Mar 29 68789:17PM    < end of copied text >      ORT Score -   Unable to assess hx dementia     REVIEW OF SYSTEMS:  limited due to hx demenita   RESPIRATORY: No cough, wheezing, chills or hemoptysis; No shortness of breath  CARDIOVASCULAR: No chest pain, palpitations, dizziness, or leg swelling  GASTROINTESTINAL: No abdominal or epigastric pain. No nausea, vomiting; No diarrhea or constipation.   MUSCULOSKELETAL: + posterior neck pain; No back or extremity pain, no upper or lower motor strength weakness  NEURO: No headaches, No numbness/tingling b/l LE, No weakness    PHYSICAL EXAM:  GENERAL:  Alert & Oriented X1 to self only, (reports she is at home, does not know date), cooperative, NAD, Good concentration. Speech is clear.   RESPIRATORY: Respirations even and unlabored. Clear to auscultation bilaterally; No rales, rhonchi, wheezing, or rubs  CARDIOVASCULAR: Normal S1/S2, regular rate and rhythm; No murmurs, rubs, or gallops. No JVD.   GASTROINTESTINAL:  Soft, Nontender, Nondistended; Bowel sounds present  PERIPHERAL VASCULAR:  Extremities warm without edema. 2+ Peripheral Pulses, No cyanosis, No calf tenderness  MUSCULOSKELETAL: Motor Strength 5/5 B/L upper and lower extremities; moves all extremities equally against gravity; ROM intact; negative SLR; + cervical spine tender to palpation.  SKIN: + cervical spine healed surgical scar noted. Skin warm, dry, intact. No rashes, lesions or wounds.     Risk factors associated with adverse outcomes related to opioid treatment  [ ]  Concurrent benzodiazepine use  [ ]  History/ Active substance use or alcohol use disorder  [ ] Psychiatric co-morbidity  [ ] Sleep apnea  [ ] COPD  [ ] BMI> 35  [ ] Liver dysfunction  [ ] Renal dysfunction  [ ] CHF  [X] Former Smoker  [X]  Age > 60 years    [X ]  [place]  Reviewed and Copied to Chart. See below.    Plan of care and goal oriented pain management treatment options were discussed with patient and /or primary care giver; all questions and concerns were addressed and care was aligned with patient's wishes.    Educated patient on goal oriented pain management treatment options     03-31-21 @ 13:05

## 2021-03-31 NOTE — PROGRESS NOTE ADULT - PROBLEM SELECTOR PLAN 1
Pt with posterior neck pain which is somatic, neuropathic and chronic in nature. Pt with hx of cervical spine hardware.  High risk medications reviewed. Avoid polypharmacy. Avoid IV opioids. Avoid NSAIDs and benzodiazepines. Non-pharmacological sleep aides initiated. Non-opioid medications and non-pharmacological pain management measures initiated.  Maximize non-opioid pain recommendations   - Continue acetaminophen 650mg PO q 6 hours   - Continue gabapentin 100mg PO TID- pts home medication  - Lidoderm 5% patch daily.   Bowel Regimen  - Senna 2 tablets at bedtime for constipation  Mild pain   - Non-pharmacological pain treatment recommendations  - Warm/ Cool packs PRN   - Repositioning, imagery, relaxation, distraction.  - Physical therapy OOB if no contraindications   Recommendations discussed with primary team and RN.

## 2021-03-31 NOTE — PROGRESS NOTE ADULT - SUBJECTIVE AND OBJECTIVE BOX
Patient is a 86y old  Female who presents with a chief complaint of placement (30 Mar 2021 10:11)    pt seen in icu [  ], reg med floor [ x  ], bed [ ], chair at bedside [x  ], a+o x3 [ x ], lethargic [  ],  nad [ x ]      Allergies    No Known Allergies        Vitals    T(F): 98.6 (03-31-21 @ 05:05), Max: 99.4 (03-30-21 @ 20:31)  HR: 88 (03-31-21 @ 05:05) (88 - 118)  BP: 129/78 (03-31-21 @ 05:05) (110/41 - 161/77)  RR: 16 (03-31-21 @ 05:05) (16 - 17)  SpO2: 95% (03-31-21 @ 05:05) (92% - 96%)  Wt(kg): --  CAPILLARY BLOOD GLUCOSE      POCT Blood Glucose.: 141 mg/dL (31 Mar 2021 11:58)      Labs                          11.7   6.90  )-----------( 285      ( 30 Mar 2021 13:23 )             35.6       03-30    136  |  100  |  23<H>  ----------------------------<  181<H>  3.7   |  24  |  1.01    Ca    9.1      30 Mar 2021 13:23  Phos  2.8     03-30  Mg     1.7     03-30    TPro  7.7  /  Alb  4.1  /  TBili  0.5  /  DBili  x   /  AST  36  /  ALT  35  /  AlkPhos  48  03-30      CARDIAC MARKERS ( 29 Mar 2021 15:50 )  <0.015 ng/mL / x     / 59 U/L / x     / x                Radiology Results      Meds    MEDICATIONS  (STANDING):  acetaminophen   Tablet .. 650 milliGRAM(s) Oral every 6 hours  donepezil 5 milliGRAM(s) Oral at bedtime  escitalopram 10 milliGRAM(s) Oral daily  gabapentin 100 milliGRAM(s) Oral three times a day  glucagon  Injectable 1 milliGRAM(s) IntraMuscular once  hydrochlorothiazide 12.5 milliGRAM(s) Oral daily  insulin lispro (ADMELOG) corrective regimen sliding scale   SubCutaneous three times a day before meals  insulin lispro (ADMELOG) corrective regimen sliding scale   SubCutaneous at bedtime  levothyroxine 50 MICROGram(s) Oral daily  lidocaine   Patch 1 Patch Transdermal daily  loratadine 10 milliGRAM(s) Oral daily  melatonin 3 milliGRAM(s) Oral at bedtime  memantine 5 milliGRAM(s) Oral two times a day  pantoprazole    Tablet 40 milliGRAM(s) Oral before breakfast  senna 2 Tablet(s) Oral at bedtime  simvastatin 20 milliGRAM(s) Oral at bedtime      MEDICATIONS  (PRN):  ondansetron Injectable 4 milliGRAM(s) IV Push once PRN Nausea      Physical Exam    Neuro :  no focal deficits  Respiratory: CTA B/L  CV: RRR, S1S2, no murmurs,   Abdominal: Soft, NT, ND +BS,  Extremities: No edema, + peripheral pulses    ASSESSMENT  intractable neck pain,   h/o Diabetes Mellitus   Hypertension   HLD   dementia,   c-spine hardware        PLAN      cont pain control   pain mgmt eval noted  Maximize non-opioid pain recommendations   - Continue acetaminophen 650mg PO q 6 hours   - Continue gabapentin 100mg PO TID- pts home medication  - Lidoderm 5% patch daily.   Bowel Regimen  - Senna 2 tablets at bedtime for constipation  Mild pain   - Non-pharmacological pain treatment recommendations  - Warm/ Cool packs PRN   phys tx eval noted and rec phys tx 2-3x/week x 3 weeks at [facility]  soc wk eval  case mgmt eval.  lispro ss  cont current meds   d/c planning

## 2021-03-31 NOTE — PROGRESS NOTE ADULT - PROBLEM SELECTOR PLAN 1
-dementia with h/o sundowning  -continue melatonin in evening  -c/w standing tylenol, lidocaine patch and  compresses in addition to gabapentin  -continue memantine, lexapro  -pain consult on board

## 2021-04-01 DIAGNOSIS — R00.0 TACHYCARDIA, UNSPECIFIED: ICD-10-CM

## 2021-04-01 DIAGNOSIS — E78.5 HYPERLIPIDEMIA, UNSPECIFIED: ICD-10-CM

## 2021-04-01 LAB
GLUCOSE BLDC GLUCOMTR-MCNC: 137 MG/DL — HIGH (ref 70–99)
GLUCOSE BLDC GLUCOMTR-MCNC: 150 MG/DL — HIGH (ref 70–99)
GLUCOSE BLDC GLUCOMTR-MCNC: 164 MG/DL — HIGH (ref 70–99)
GLUCOSE BLDC GLUCOMTR-MCNC: 87 MG/DL — SIGNIFICANT CHANGE UP (ref 70–99)
SARS-COV-2 RNA SPEC QL NAA+PROBE: SIGNIFICANT CHANGE UP

## 2021-04-01 RX ORDER — SIMVASTATIN 20 MG/1
40 TABLET, FILM COATED ORAL AT BEDTIME
Refills: 0 | Status: DISCONTINUED | OUTPATIENT
Start: 2021-04-01 | End: 2021-04-07

## 2021-04-01 RX ORDER — METOPROLOL TARTRATE 50 MG
12.5 TABLET ORAL
Refills: 0 | Status: DISCONTINUED | OUTPATIENT
Start: 2021-04-01 | End: 2021-04-02

## 2021-04-01 RX ADMIN — ESCITALOPRAM OXALATE 10 MILLIGRAM(S): 10 TABLET, FILM COATED ORAL at 12:00

## 2021-04-01 RX ADMIN — MEMANTINE HYDROCHLORIDE 5 MILLIGRAM(S): 10 TABLET ORAL at 05:50

## 2021-04-01 RX ADMIN — Medication 0: at 21:21

## 2021-04-01 RX ADMIN — Medication 650 MILLIGRAM(S): at 00:35

## 2021-04-01 RX ADMIN — Medication 650 MILLIGRAM(S): at 17:36

## 2021-04-01 RX ADMIN — LORATADINE 10 MILLIGRAM(S): 10 TABLET ORAL at 12:00

## 2021-04-01 RX ADMIN — LIDOCAINE 1 PATCH: 4 CREAM TOPICAL at 12:30

## 2021-04-01 RX ADMIN — Medication 50 MICROGRAM(S): at 05:51

## 2021-04-01 RX ADMIN — Medication 3 MILLIGRAM(S): at 21:20

## 2021-04-01 RX ADMIN — LIDOCAINE 1 PATCH: 4 CREAM TOPICAL at 19:46

## 2021-04-01 RX ADMIN — MEMANTINE HYDROCHLORIDE 5 MILLIGRAM(S): 10 TABLET ORAL at 17:37

## 2021-04-01 RX ADMIN — DONEPEZIL HYDROCHLORIDE 5 MILLIGRAM(S): 10 TABLET, FILM COATED ORAL at 21:20

## 2021-04-01 RX ADMIN — Medication 650 MILLIGRAM(S): at 12:45

## 2021-04-01 RX ADMIN — SIMVASTATIN 40 MILLIGRAM(S): 20 TABLET, FILM COATED ORAL at 21:19

## 2021-04-01 RX ADMIN — Medication 650 MILLIGRAM(S): at 06:30

## 2021-04-01 RX ADMIN — Medication 650 MILLIGRAM(S): at 05:51

## 2021-04-01 RX ADMIN — GABAPENTIN 100 MILLIGRAM(S): 400 CAPSULE ORAL at 05:51

## 2021-04-01 RX ADMIN — Medication 650 MILLIGRAM(S): at 12:02

## 2021-04-01 RX ADMIN — GABAPENTIN 100 MILLIGRAM(S): 400 CAPSULE ORAL at 13:56

## 2021-04-01 RX ADMIN — PANTOPRAZOLE SODIUM 40 MILLIGRAM(S): 20 TABLET, DELAYED RELEASE ORAL at 05:50

## 2021-04-01 RX ADMIN — Medication 12.5 MILLIGRAM(S): at 17:37

## 2021-04-01 RX ADMIN — SENNA PLUS 2 TABLET(S): 8.6 TABLET ORAL at 21:20

## 2021-04-01 RX ADMIN — GABAPENTIN 100 MILLIGRAM(S): 400 CAPSULE ORAL at 21:30

## 2021-04-01 NOTE — PROGRESS NOTE ADULT - PROBLEM SELECTOR PLAN 2
adm 3/29 posterior neck pain  -cervical spine hardware noted on CXR, otherwise unremarkable  -per pain management somatic, neuropathic, and chronic  -given age and comorbidities, avoid polypharmacy, IV opioids, NSAIDs, benzos   -start Tylenol 650mg PO q6 and lidocaine patch Qd over affected area  -continue home gabapentin 100mg PO TID  -bowel regimen with senna

## 2021-04-01 NOTE — PROGRESS NOTE ADULT - SUBJECTIVE AND OBJECTIVE BOX
PGY-1 Progress Note discussed with attending    PAGER #: [[phone]] TILL 5:00 PM  PLEASE CONTACT ON CALL TEAM:  - On Call Team (Please refer to Curly) FROM 5:00 PM - 8:30PM  - Nightfloat Team FROM 8:30 -7:30 AM    CHIEF COMPLAINT & BRIEF HOSPITAL COURSE: 86F DNR/DNI from home alone, HHA 9am-5pm, PMH DM, HTN, HLD, dementia c/b sundowning, recent left-sided neck pain (recent rx gabapentin) BIB niece 3/29 for NH placement given lack of HHA assistance at night. In ED, afebrile, SBPs 120-140s, HR 120s, SpO2 98 RA. EKG sinus tach. Adm labs unremarkable, trop x1, UA, and COVID negative. Given x2 doses Haldol 2mg IM and Zyprexa 2.5mg PO in ED for agitation. Admitted to [facility] for placement.    Cervical spine hardware noted on CXR, otherwise unremarkable. Endorsed posterior neck pain, assessed by pain management to be somatic, neuropathic, and chronic. Given age and comorbidities, polypharmacy, IV opioids, NSAIDs, and benzos should be avoided. Managed with Tylenol 650mg PO q6, home gabapentin 100mg PO TID, and daily lidocaine patch over affected area.     INTERVAL HPI/OVERNIGHT EVENTS: NAEON. Afebrile, continuing to tolerate RA. SBPs variable 120-160s, monitor. Continuing to have tachy low 100s, will fu repeat EKG today. Reports feeling "a bit better" today, denies complaints. Awaiting free T4 and MMA. Increased home statin given chol 272,  on current regimen. Continued above pain regimen. Long-term placement per PT, awaiting SW/CM setup.    MEDICATIONS  (STANDING):  acetaminophen   Tablet .. 650 milliGRAM(s) Oral every 6 hours  donepezil 5 milliGRAM(s) Oral at bedtime  escitalopram 10 milliGRAM(s) Oral daily  gabapentin 100 milliGRAM(s) Oral three times a day  hydrochlorothiazide 12.5 milliGRAM(s) Oral daily  insulin lispro (ADMELOG) corrective regimen sliding scale   SubCutaneous three times a day before meals  insulin lispro (ADMELOG) corrective regimen sliding scale   SubCutaneous at bedtime  levothyroxine 50 MICROGram(s) Oral daily  lidocaine   Patch 1 Patch Transdermal daily  loratadine 10 milliGRAM(s) Oral daily  melatonin 3 milliGRAM(s) Oral at bedtime  memantine 5 milliGRAM(s) Oral two times a day  metoprolol tartrate 12.5 milliGRAM(s) Oral two times a day  pantoprazole    Tablet 40 milliGRAM(s) Oral before breakfast  senna 2 Tablet(s) Oral at bedtime  simvastatin 40 milliGRAM(s) Oral at bedtime    REVIEW OF SYSTEMS:  CONSTITUTIONAL: No fever or fatigue  HEENT: posterior neck pain  RESPIRATORY: No cough, no shortness of breath  CARDIOVASCULAR: No chest pain  GASTROINTESTINAL: No abdominal pain. resolved nausea, no vomiting; No diarrhea or constipation.   GENITOURINARY: No dysuria or hematuria  NEUROLOGICAL: No headaches  SKIN: No itching or rashes  all other systems reviewed and are negative     Vital Signs Last 24 Hrs  T(C): 36.7 (01 Apr 2021 13:33), Max: 36.9 (31 Mar 2021 20:08)  T(F): 98 (01 Apr 2021 13:33), Max: 98.4 (31 Mar 2021 20:08)  HR: 98 (01 Apr 2021 13:33) (98 - 107)  BP: 150/76 (01 Apr 2021 13:33) (140/65 - 153/75)  BP(mean): --  RR: 20 (01 Apr 2021 13:33) (18 - 20)  SpO2: 98% (01 Apr 2021 13:33) (92% - 98%)    PHYSICAL EXAMINATION:  GENERAL: NAD, appears stated age, lying in bed  HEAD:  Atraumatic, Normocephalic  EYES:  conjunctiva and sclera clear, eomi, perrl  NECK: Supple, TTP posteriorly  CHEST/LUNG: no increased WOB, good air movement b/l  HEART: RRR  ABDOMEN: Soft, Nontender, Nondistended; Bowel sounds present  NERVOUS SYSTEM: alert and oriented x1-2  EXTREMITIES:  2+ Peripheral Pulses, No edema  SKIN: warm dry                      CAPILLARY BLOOD GLUCOSE      RADIOLOGY & ADDITIONAL TESTS:

## 2021-04-01 NOTE — PROGRESS NOTE ADULT - PROBLEM SELECTOR PLAN 1
PMH dementia c/b sundowning, HHA 9am-5pm x7 days, BIB niece 3/29 for NH placement given lack of HHA assistance at night  -adm UA and COVID negative  -s/p x2 doses Haldol 2mg IM and Zyprexa 2.5mg PO in ED for agitation  -vit D, folate, B12, HC wnl, fu MMA  -continue home memantine, donepezil, escitalopram  -melatonin for sleep while inpatient  -LTC per PT, awaiting SW/CM setup, repeat COVID 4/1 for placement  -tolerating soft diet

## 2021-04-01 NOTE — PROGRESS NOTE ADULT - SUBJECTIVE AND OBJECTIVE BOX
Patient is a 86y old  Female who presents with a chief complaint of placement (31 Mar 2021 15:02)    pt seen in icu [  ], reg med floor [ x  ], bed [ ], chair at bedside [x  ], a+o x3 [ x ], lethargic [  ],    nad [ x ]      Allergies    No Known Allergies        Vitals    T(F): 98.2 (04-01-21 @ 05:27), Max: 98.4 (03-31-21 @ 20:08)  HR: 103 (04-01-21 @ 05:27) (103 - 116)  BP: 153/75 (04-01-21 @ 05:27) (140/65 - 162/71)  RR: 20 (04-01-21 @ 05:27) (18 - 20)  SpO2: 92% (04-01-21 @ 05:27) (92% - 96%)  Wt(kg): --  CAPILLARY BLOOD GLUCOSE      POCT Blood Glucose.: 127 mg/dL (31 Mar 2021 21:40)      Labs                          11.7   6.90  )-----------( 285      ( 30 Mar 2021 13:23 )             35.6       03-30    136  |  100  |  23<H>  ----------------------------<  181<H>  3.7   |  24  |  1.01    Ca    9.1      30 Mar 2021 13:23  Phos  2.8     03-30  Mg     1.7     03-30    TPro  7.7  /  Alb  4.1  /  TBili  0.5  /  DBili  x   /  AST  36  /  ALT  35  /  AlkPhos  48  03-30                Radiology Results      Meds    MEDICATIONS  (STANDING):  acetaminophen   Tablet .. 650 milliGRAM(s) Oral every 6 hours  donepezil 5 milliGRAM(s) Oral at bedtime  escitalopram 10 milliGRAM(s) Oral daily  gabapentin 100 milliGRAM(s) Oral three times a day  glucagon  Injectable 1 milliGRAM(s) IntraMuscular once  hydrochlorothiazide 12.5 milliGRAM(s) Oral daily  insulin lispro (ADMELOG) corrective regimen sliding scale   SubCutaneous three times a day before meals  insulin lispro (ADMELOG) corrective regimen sliding scale   SubCutaneous at bedtime  levothyroxine 50 MICROGram(s) Oral daily  lidocaine   Patch 1 Patch Transdermal daily  loratadine 10 milliGRAM(s) Oral daily  melatonin 3 milliGRAM(s) Oral at bedtime  memantine 5 milliGRAM(s) Oral two times a day  pantoprazole    Tablet 40 milliGRAM(s) Oral before breakfast  senna 2 Tablet(s) Oral at bedtime  simvastatin 20 milliGRAM(s) Oral at bedtime      MEDICATIONS  (PRN):  ondansetron Injectable 4 milliGRAM(s) IV Push once PRN Nausea      Physical Exam    Neuro :  no focal deficits  Respiratory: CTA B/L  CV: RRR, S1S2, no murmurs,   Abdominal: Soft, NT, ND +BS,  Extremities: No edema, + peripheral pulses    ASSESSMENT  intractable neck pain,   h/o Diabetes Mellitus   Hypertension   HLD   dementia,   c-spine hardware        PLAN      cont pain control   pain mgmt eval noted  Maximize non-opioid pain recommendations   - Continue acetaminophen 650mg PO q 6 hours   - Continue gabapentin 100mg PO TID- pts home medication  - Lidoderm 5% patch daily.   Bowel Regimen  - Senna 2 tablets at bedtime for constipation  Mild pain   - Non-pharmacological pain treatment recommendations  - Warm/ Cool packs PRN   phys tx eval noted and rec phys tx 2-3x/week x 3 weeks at Longterm Placement  soc wk eval  case mgmt eval.  lispro ss  cont current meds   d/c planning Patient is a 86y old  Female who presents with a chief complaint of placement (31 Mar 2021 15:02)    pt seen in icu [  ], reg med floor [ x  ], bed [ ], chair at bedside [x  ], a+o x3 [ x ], lethargic [  ],    nad [ x ]      Allergies    No Known Allergies        Vitals    T(F): 98.2 (04-01-21 @ 05:27), Max: 98.4 (03-31-21 @ 20:08)  HR: 103 (04-01-21 @ 05:27) (103 - 116)  BP: 153/75 (04-01-21 @ 05:27) (140/65 - 162/71)  RR: 20 (04-01-21 @ 05:27) (18 - 20)  SpO2: 92% (04-01-21 @ 05:27) (92% - 96%)  Wt(kg): --  CAPILLARY BLOOD GLUCOSE      POCT Blood Glucose.: 127 mg/dL (31 Mar 2021 21:40)      Labs                          11.7   6.90  )-----------( 285      ( 30 Mar 2021 13:23 )             35.6       03-30    136  |  100  |  23<H>  ----------------------------<  181<H>  3.7   |  24  |  1.01    Ca    9.1      30 Mar 2021 13:23  Phos  2.8     03-30  Mg     1.7     03-30    TPro  7.7  /  Alb  4.1  /  TBili  0.5  /  DBili  x   /  AST  36  /  ALT  35  /  AlkPhos  48  03-30                Radiology Results      Meds    MEDICATIONS  (STANDING):  acetaminophen   Tablet .. 650 milliGRAM(s) Oral every 6 hours  donepezil 5 milliGRAM(s) Oral at bedtime  escitalopram 10 milliGRAM(s) Oral daily  gabapentin 100 milliGRAM(s) Oral three times a day  glucagon  Injectable 1 milliGRAM(s) IntraMuscular once  hydrochlorothiazide 12.5 milliGRAM(s) Oral daily  insulin lispro (ADMELOG) corrective regimen sliding scale   SubCutaneous three times a day before meals  insulin lispro (ADMELOG) corrective regimen sliding scale   SubCutaneous at bedtime  levothyroxine 50 MICROGram(s) Oral daily  lidocaine   Patch 1 Patch Transdermal daily  loratadine 10 milliGRAM(s) Oral daily  melatonin 3 milliGRAM(s) Oral at bedtime  memantine 5 milliGRAM(s) Oral two times a day  pantoprazole    Tablet 40 milliGRAM(s) Oral before breakfast  senna 2 Tablet(s) Oral at bedtime  simvastatin 20 milliGRAM(s) Oral at bedtime      MEDICATIONS  (PRN):  ondansetron Injectable 4 milliGRAM(s) IV Push once PRN Nausea      Physical Exam    Neuro :  no focal deficits  Respiratory: CTA B/L  CV: RRR, S1S2, no murmurs,   Abdominal: Soft, NT, ND +BS,  Extremities: No edema, + peripheral pulses    ASSESSMENT  intractable neck pain,   h/o Diabetes Mellitus   Hypertension   HLD   dementia,   c-spine hardware        PLAN      cont pain control   pain mgmt eval noted  Maximize non-opioid pain recommendations   - Continue acetaminophen 650mg PO q 6 hours   - Continue gabapentin 100mg PO TID- pts home medication  - Lidoderm 5% patch daily.   Bowel Regimen  - Senna 2 tablets at bedtime for constipation  Mild pain   - Non-pharmacological pain treatment recommendations  - Warm/ Cool packs PRN   phys tx eval noted and rec phys tx 2-3x/week x 3 weeks at Longterm Placement  soc wk eval  case mgmt eval.  lispro ss   add lopressor 12.5 mg for elevater bp and hr  cont current meds   d/c planning for ltc

## 2021-04-01 NOTE — PROGRESS NOTE ADULT - PROBLEM SELECTOR PLAN 3
in ED, afebrile, SBPs 120-140s, HR 120s, SpO2 98 RA  -adm EKG sinus tach, trop x1 wnl  -continues to have episodes tachy 100s  -fu repeat EKG 4/1  -metoprolol 12.5mg BID started 4/1

## 2021-04-02 ENCOUNTER — TRANSCRIPTION ENCOUNTER (OUTPATIENT)
Age: 86
End: 2021-04-02

## 2021-04-02 LAB
ALBUMIN SERPL ELPH-MCNC: 3.8 G/DL — SIGNIFICANT CHANGE UP (ref 3.5–5)
ALP SERPL-CCNC: 45 U/L — SIGNIFICANT CHANGE UP (ref 40–120)
ALT FLD-CCNC: 38 U/L DA — SIGNIFICANT CHANGE UP (ref 10–60)
ANION GAP SERPL CALC-SCNC: 15 MMOL/L — SIGNIFICANT CHANGE UP (ref 5–17)
AST SERPL-CCNC: 48 U/L — HIGH (ref 10–40)
BILIRUB SERPL-MCNC: 0.7 MG/DL — SIGNIFICANT CHANGE UP (ref 0.2–1.2)
BUN SERPL-MCNC: 22 MG/DL — HIGH (ref 7–18)
CALCIUM SERPL-MCNC: 9.1 MG/DL — SIGNIFICANT CHANGE UP (ref 8.4–10.5)
CHLORIDE SERPL-SCNC: 98 MMOL/L — SIGNIFICANT CHANGE UP (ref 96–108)
CO2 SERPL-SCNC: 24 MMOL/L — SIGNIFICANT CHANGE UP (ref 22–31)
CREAT SERPL-MCNC: 0.95 MG/DL — SIGNIFICANT CHANGE UP (ref 0.5–1.3)
GLUCOSE BLDC GLUCOMTR-MCNC: 114 MG/DL — HIGH (ref 70–99)
GLUCOSE BLDC GLUCOMTR-MCNC: 116 MG/DL — HIGH (ref 70–99)
GLUCOSE BLDC GLUCOMTR-MCNC: 129 MG/DL — HIGH (ref 70–99)
GLUCOSE BLDC GLUCOMTR-MCNC: 146 MG/DL — HIGH (ref 70–99)
GLUCOSE SERPL-MCNC: 106 MG/DL — HIGH (ref 70–99)
HCT VFR BLD CALC: 38.7 % — SIGNIFICANT CHANGE UP (ref 34.5–45)
HGB BLD-MCNC: 12.8 G/DL — SIGNIFICANT CHANGE UP (ref 11.5–15.5)
MAGNESIUM SERPL-MCNC: 2 MG/DL — SIGNIFICANT CHANGE UP (ref 1.6–2.6)
MCHC RBC-ENTMCNC: 27.6 PG — SIGNIFICANT CHANGE UP (ref 27–34)
MCHC RBC-ENTMCNC: 33.1 GM/DL — SIGNIFICANT CHANGE UP (ref 32–36)
MCV RBC AUTO: 83.4 FL — SIGNIFICANT CHANGE UP (ref 80–100)
METHYLMALONATE UR-MCNC: 2.91 — SIGNIFICANT CHANGE UP
NRBC # BLD: 0 /100 WBCS — SIGNIFICANT CHANGE UP (ref 0–0)
PHOSPHATE SERPL-MCNC: 3.3 MG/DL — SIGNIFICANT CHANGE UP (ref 2.5–4.5)
PLATELET # BLD AUTO: 292 K/UL — SIGNIFICANT CHANGE UP (ref 150–400)
POTASSIUM SERPL-MCNC: 3.4 MMOL/L — LOW (ref 3.5–5.3)
POTASSIUM SERPL-SCNC: 3.4 MMOL/L — LOW (ref 3.5–5.3)
PROT SERPL-MCNC: 7.5 G/DL — SIGNIFICANT CHANGE UP (ref 6–8.3)
RBC # BLD: 4.64 M/UL — SIGNIFICANT CHANGE UP (ref 3.8–5.2)
RBC # FLD: 13.9 % — SIGNIFICANT CHANGE UP (ref 10.3–14.5)
SODIUM SERPL-SCNC: 137 MMOL/L — SIGNIFICANT CHANGE UP (ref 135–145)
WBC # BLD: 5.3 K/UL — SIGNIFICANT CHANGE UP (ref 3.8–10.5)
WBC # FLD AUTO: 5.3 K/UL — SIGNIFICANT CHANGE UP (ref 3.8–10.5)

## 2021-04-02 RX ORDER — METOPROLOL TARTRATE 50 MG
1 TABLET ORAL
Qty: 0 | Refills: 0 | DISCHARGE
Start: 2021-04-02

## 2021-04-02 RX ORDER — SIMVASTATIN 20 MG/1
1 TABLET, FILM COATED ORAL
Qty: 0 | Refills: 0 | DISCHARGE
Start: 2021-04-02

## 2021-04-02 RX ORDER — LIDOCAINE 4 G/100G
1 CREAM TOPICAL
Qty: 0 | Refills: 0 | DISCHARGE
Start: 2021-04-02

## 2021-04-02 RX ORDER — POTASSIUM CHLORIDE 20 MEQ
40 PACKET (EA) ORAL ONCE
Refills: 0 | Status: COMPLETED | OUTPATIENT
Start: 2021-04-02 | End: 2021-04-02

## 2021-04-02 RX ORDER — ENOXAPARIN SODIUM 100 MG/ML
40 INJECTION SUBCUTANEOUS DAILY
Refills: 0 | Status: DISCONTINUED | OUTPATIENT
Start: 2021-04-02 | End: 2021-04-07

## 2021-04-02 RX ORDER — METOPROLOL TARTRATE 50 MG
25 TABLET ORAL
Refills: 0 | Status: DISCONTINUED | OUTPATIENT
Start: 2021-04-02 | End: 2021-04-07

## 2021-04-02 RX ADMIN — Medication 50 MICROGRAM(S): at 05:21

## 2021-04-02 RX ADMIN — ENOXAPARIN SODIUM 40 MILLIGRAM(S): 100 INJECTION SUBCUTANEOUS at 12:02

## 2021-04-02 RX ADMIN — Medication 12.5 MILLIGRAM(S): at 05:21

## 2021-04-02 RX ADMIN — Medication 25 MILLIGRAM(S): at 17:43

## 2021-04-02 RX ADMIN — Medication 650 MILLIGRAM(S): at 05:21

## 2021-04-02 RX ADMIN — GABAPENTIN 100 MILLIGRAM(S): 400 CAPSULE ORAL at 21:45

## 2021-04-02 RX ADMIN — Medication 650 MILLIGRAM(S): at 18:41

## 2021-04-02 RX ADMIN — LIDOCAINE 1 PATCH: 4 CREAM TOPICAL at 00:02

## 2021-04-02 RX ADMIN — PANTOPRAZOLE SODIUM 40 MILLIGRAM(S): 20 TABLET, DELAYED RELEASE ORAL at 06:32

## 2021-04-02 RX ADMIN — Medication 650 MILLIGRAM(S): at 00:02

## 2021-04-02 RX ADMIN — Medication 650 MILLIGRAM(S): at 05:25

## 2021-04-02 RX ADMIN — SENNA PLUS 2 TABLET(S): 8.6 TABLET ORAL at 21:45

## 2021-04-02 RX ADMIN — Medication 650 MILLIGRAM(S): at 17:44

## 2021-04-02 RX ADMIN — Medication 650 MILLIGRAM(S): at 00:01

## 2021-04-02 RX ADMIN — GABAPENTIN 100 MILLIGRAM(S): 400 CAPSULE ORAL at 14:30

## 2021-04-02 RX ADMIN — LIDOCAINE 1 PATCH: 4 CREAM TOPICAL at 20:15

## 2021-04-02 RX ADMIN — LIDOCAINE 1 PATCH: 4 CREAM TOPICAL at 11:55

## 2021-04-02 RX ADMIN — SIMVASTATIN 40 MILLIGRAM(S): 20 TABLET, FILM COATED ORAL at 21:45

## 2021-04-02 RX ADMIN — Medication 40 MILLIEQUIVALENT(S): at 14:28

## 2021-04-02 RX ADMIN — Medication 650 MILLIGRAM(S): at 11:55

## 2021-04-02 RX ADMIN — GABAPENTIN 100 MILLIGRAM(S): 400 CAPSULE ORAL at 05:24

## 2021-04-02 RX ADMIN — MEMANTINE HYDROCHLORIDE 5 MILLIGRAM(S): 10 TABLET ORAL at 17:43

## 2021-04-02 RX ADMIN — ESCITALOPRAM OXALATE 10 MILLIGRAM(S): 10 TABLET, FILM COATED ORAL at 11:55

## 2021-04-02 RX ADMIN — LIDOCAINE 1 PATCH: 4 CREAM TOPICAL at 23:00

## 2021-04-02 RX ADMIN — DONEPEZIL HYDROCHLORIDE 5 MILLIGRAM(S): 10 TABLET, FILM COATED ORAL at 21:44

## 2021-04-02 RX ADMIN — MEMANTINE HYDROCHLORIDE 5 MILLIGRAM(S): 10 TABLET ORAL at 05:22

## 2021-04-02 RX ADMIN — Medication 650 MILLIGRAM(S): at 14:27

## 2021-04-02 RX ADMIN — Medication 3 MILLIGRAM(S): at 21:45

## 2021-04-02 NOTE — DISCHARGE NOTE PROVIDER - NSDCMRMEDTOKEN_GEN_ALL_CORE_FT
donepezil 5 mg oral tablet: 1 tab(s) orally once a day (at bedtime)  escitalopram 10 mg oral tablet: 1 tab(s) orally once a day  gabapentin 100 mg oral capsule: 1 cap(s) orally 3 times a day  hydroCHLOROthiazide 12.5 mg oral tablet: 1 tab(s) orally once a day  memantine 5 mg oral tablet: 1 tab(s) orally 2 times a day  metFORMIN 1000 mg oral tablet, extended release: 1 tab(s) orally once a day  omeprazole 40 mg oral delayed release capsule: 1 cap(s) orally once a day  pioglitazone 45 mg oral tablet: 1 tab(s) orally once a day  simvastatin 20 mg oral tablet: 1 tab(s) orally once a day (at bedtime)  Synthroid 50 mcg (0.05 mg) oral tablet: 1 tab(s) orally once a day   donepezil 5 mg oral tablet: 1 tab(s) orally once a day (at bedtime)  escitalopram 10 mg oral tablet: 1 tab(s) orally once a day  gabapentin 100 mg oral capsule: 1 cap(s) orally 3 times a day  hydroCHLOROthiazide 12.5 mg oral tablet: 1 tab(s) orally once a day  lidocaine 5% topical film: Apply topically to affected area once a day  memantine 5 mg oral tablet: 1 tab(s) orally 2 times a day  metFORMIN 1000 mg oral tablet, extended release: 1 tab(s) orally once a day  metoprolol tartrate 25 mg oral tablet: 1 tab(s) orally 2 times a day  pioglitazone 45 mg oral tablet: 1 tab(s) orally once a day  simvastatin 40 mg oral tablet: 1 tab(s) orally once a day (at bedtime)  Synthroid 50 mcg (0.05 mg) oral tablet: 1 tab(s) orally once a day

## 2021-04-02 NOTE — PROGRESS NOTE ADULT - PROBLEM SELECTOR PLAN 3
in ED, afebrile, SBPs 120-140s, HR 120s, SpO2 98 RA  -adm EKG sinus tach, trop x1 wnl  -continues to have episodes tachy 100s  -repeat EKG 4/1 sinus tach  -metoprolol 12.5mg BID started 4/1, increased 25mg BID 4/2

## 2021-04-02 NOTE — PROGRESS NOTE ADULT - PROBLEM SELECTOR PLAN 1
PMH dementia c/b sundowning, HHA 9am-5pm x7 days, BIB niece 3/29 for NH placement given lack of HHA assistance at night  -adm UA and COVID negative  -s/p x2 doses Haldol 2mg IM and Zyprexa 2.5mg PO in ED for agitation  -vit D, folate, B12, HC wnl, fu MMA  -continue home memantine, donepezil, escitalopram  -melatonin for sleep while inpatient  -LTC per PT, awaiting SW/CM setup, repeat COVID 4/1 negative  -tolerating soft diet

## 2021-04-02 NOTE — DISCHARGE NOTE PROVIDER - HOSPITAL COURSE
86F DNR/DNI from home alone, HHA 9am-5pm, PMH DM, HTN, HLD, dementia c/b sundowning, recent left-sided neck pain (recent rx gabapentin) BIB niece 3/29 for NH placement given lack of HHA assistance at night. In ED, afebrile, SBPs 120-140s, HR 120s, SpO2 98 RA. EKG sinus tach. Adm labs unremarkable, trop x1, UA, and COVID negative. Given x2 doses Haldol 2mg IM and Zyprexa 2.5mg PO in ED for agitation. Admitted to [facility] for placement.    Cervical spine hardware noted on CXR, otherwise unremarkable. Endorsed posterior neck pain, assessed by pain management to be somatic, neuropathic, and chronic. Given age and comorbidities, polypharmacy, IV opioids, NSAIDs, and benzos should be avoided. Managed with Tylenol 650mg PO q6, home gabapentin 100mg PO TID, and daily lidocaine patch over affected area. 86F DNR/DNI from home alone, HHA 9am-5pm, PMH DM, HTN, HLD, dementia c/b sundowning, recent left-sided neck pain (recent rx gabapentin) BIB niece 3/29 for NH placement given lack of HHA assistance at night. In ED, afebrile, SBPs 120-140s, HR 120s, SpO2 98 RA. EKG sinus tach. Adm labs unremarkable, trop x1, UA, and COVID negative. Given x2 doses Haldol 2mg IM and Zyprexa 2.5mg PO in ED for agitation. Admitted to [facility] for placement.    Cervical spine hardware noted on CXR, otherwise unremarkable. Endorsed posterior neck pain, assessed by pain management to be somatic, neuropathic, and chronic. Given age and comorbidities, polypharmacy, IV opioids, NSAIDs, and benzos should be avoided. Managed with Tylenol 650mg PO q6, home gabapentin 100mg PO TID, and daily lidocaine patch over affected area.     Patient is stable for discharge per attending and is advised to follow up with PCP as outpatient  Please refer to patient's complete medical chart with documents for a full hospital course, for this is only a brief summary.

## 2021-04-02 NOTE — PROGRESS NOTE ADULT - SUBJECTIVE AND OBJECTIVE BOX
Patient is a 86y old  Female who presents with a chief complaint of placement (01 Apr 2021 16:11)    pt seen in icu [  ], reg med floor [ x  ], bed [ ], chair at bedside [x  ], a+o x3 [ x ], lethargic [  ],    nad [ x ]      Allergies    No Known Allergies        Vitals    T(F): 98.2 (04-02-21 @ 05:31), Max: 98.2 (04-01-21 @ 20:15)  HR: 92 (04-02-21 @ 05:31) (78 - 98)  BP: 156/79 (04-02-21 @ 05:31) (147/69 - 156/79)  RR: 19 (04-02-21 @ 05:31) (18 - 20)  SpO2: 96% (04-02-21 @ 05:31) (94% - 98%)  Wt(kg): --  CAPILLARY BLOOD GLUCOSE      POCT Blood Glucose.: 164 mg/dL (01 Apr 2021 20:50)      Labs                          Radiology Results      Meds    MEDICATIONS  (STANDING):  acetaminophen   Tablet .. 650 milliGRAM(s) Oral every 6 hours  donepezil 5 milliGRAM(s) Oral at bedtime  escitalopram 10 milliGRAM(s) Oral daily  gabapentin 100 milliGRAM(s) Oral three times a day  hydrochlorothiazide 12.5 milliGRAM(s) Oral daily  insulin lispro (ADMELOG) corrective regimen sliding scale   SubCutaneous three times a day before meals  insulin lispro (ADMELOG) corrective regimen sliding scale   SubCutaneous at bedtime  levothyroxine 50 MICROGram(s) Oral daily  lidocaine   Patch 1 Patch Transdermal daily  loratadine 10 milliGRAM(s) Oral daily  melatonin 3 milliGRAM(s) Oral at bedtime  memantine 5 milliGRAM(s) Oral two times a day  metoprolol tartrate 12.5 milliGRAM(s) Oral two times a day  pantoprazole    Tablet 40 milliGRAM(s) Oral before breakfast  senna 2 Tablet(s) Oral at bedtime  simvastatin 40 milliGRAM(s) Oral at bedtime      MEDICATIONS  (PRN):      Physical Exam    Neuro :  no focal deficits  Respiratory: CTA B/L  CV: RRR, S1S2, no murmurs,   Abdominal: Soft, NT, ND +BS,  Extremities: No edema, + peripheral pulses    ASSESSMENT  intractable neck pain,   h/o Diabetes Mellitus   Hypertension   HLD   dementia,   c-spine hardware        PLAN      cont pain control   pain mgmt eval noted  Maximize non-opioid pain recommendations   - Continue acetaminophen 650mg PO q 6 hours   - Continue gabapentin 100mg PO TID- pts home medication  - Lidoderm 5% patch daily.   Bowel Regimen  - Senna 2 tablets at bedtime for constipation  Mild pain   - Non-pharmacological pain treatment recommendations  - Warm/ Cool packs PRN   phys tx eval noted and rec phys tx 2-3x/week x 3 weeks at Longterm Placement  soc wk eval  case mgmt eval.  lispro ss   add lopressor 12.5 mg for elevater bp and hr  cont current meds   d/c planning for ltc       Patient is a 86y old  Female who presents with a chief complaint of placement (01 Apr 2021 16:11)    pt seen in icu [  ], reg med floor [ x  ], bed [ ], chair at bedside [x  ], a+o x3 [ x ], lethargic [  ],    nad [ x ]      Allergies    No Known Allergies        Vitals    T(F): 98.2 (04-02-21 @ 05:31), Max: 98.2 (04-01-21 @ 20:15)  HR: 92 (04-02-21 @ 05:31) (78 - 98)  BP: 156/79 (04-02-21 @ 05:31) (147/69 - 156/79)  RR: 19 (04-02-21 @ 05:31) (18 - 20)  SpO2: 96% (04-02-21 @ 05:31) (94% - 98%)  Wt(kg): --  CAPILLARY BLOOD GLUCOSE      POCT Blood Glucose.: 164 mg/dL (01 Apr 2021 20:50)      Labs                          Radiology Results      Meds    MEDICATIONS  (STANDING):  acetaminophen   Tablet .. 650 milliGRAM(s) Oral every 6 hours  donepezil 5 milliGRAM(s) Oral at bedtime  escitalopram 10 milliGRAM(s) Oral daily  gabapentin 100 milliGRAM(s) Oral three times a day  hydrochlorothiazide 12.5 milliGRAM(s) Oral daily  insulin lispro (ADMELOG) corrective regimen sliding scale   SubCutaneous three times a day before meals  insulin lispro (ADMELOG) corrective regimen sliding scale   SubCutaneous at bedtime  levothyroxine 50 MICROGram(s) Oral daily  lidocaine   Patch 1 Patch Transdermal daily  loratadine 10 milliGRAM(s) Oral daily  melatonin 3 milliGRAM(s) Oral at bedtime  memantine 5 milliGRAM(s) Oral two times a day  metoprolol tartrate 12.5 milliGRAM(s) Oral two times a day  pantoprazole    Tablet 40 milliGRAM(s) Oral before breakfast  senna 2 Tablet(s) Oral at bedtime  simvastatin 40 milliGRAM(s) Oral at bedtime      MEDICATIONS  (PRN):      Physical Exam    Neuro :  no focal deficits  Respiratory: CTA B/L  CV: RRR, S1S2, no murmurs,   Abdominal: Soft, NT, ND +BS,  Extremities: No edema, + peripheral pulses    ASSESSMENT  intractable neck pain,   h/o Diabetes Mellitus   Hypertension   HLD   dementia,   c-spine hardware        PLAN      cont pain control   pain mgmt eval noted  Maximize non-opioid pain recommendations   - Continue acetaminophen 650mg PO q 6 hours   - Continue gabapentin 100mg PO TID- pts home medication  - Lidoderm 5% patch daily.   Bowel Regimen  - Senna 2 tablets at bedtime for constipation  Mild pain   - Non-pharmacological pain treatment recommendations  - Warm/ Cool packs PRN   phys tx eval noted and rec phys tx 2-3x/week x 3 weeks at Longterm Placement  soc wk eval  case mgmt eval.  lispro ss   add lopressor 12.5 mg for elevater bp and hr  cont current meds   d/c planning for ltc to [facility] pending auth

## 2021-04-02 NOTE — PROGRESS NOTE ADULT - SUBJECTIVE AND OBJECTIVE BOX
PGY-1 Progress Note discussed with attending    PAGER #: [[phone]] TILL 5:00 PM  PLEASE CONTACT ON CALL TEAM:  - On Call Team (Please refer to Curly) FROM 5:00 PM - 8:30PM  - Nightfloat Team FROM 8:30 -7:30 AM    CHIEF COMPLAINT & BRIEF HOSPITAL COURSE: 86F DNR/DNI from home alone, HHA 9am-5pm, PMH DM, HTN, HLD, dementia c/b sundowning, recent left-sided neck pain (recent rx gabapentin) BIB niece 3/29 for NH placement given lack of HHA assistance at night. In ED, afebrile, SBPs 120-140s, HR 120s, SpO2 98 RA. EKG sinus tach. Adm labs unremarkable, trop x1, UA, and COVID negative. Given x2 doses Haldol 2mg IM and Zyprexa 2.5mg PO in ED for agitation. Admitted to [facility] for [facility].    Cervical spine hardware noted on CXR, otherwise unremarkable. Endorsed posterior neck pain, assessed by pain management to be somatic, neuropathic, and chronic. Given age and comorbidities, polypharmacy, IV opioids, NSAIDs, and benzos should be avoided. Managed with Tylenol 650mg PO q6, home gabapentin 100mg PO TID, and daily lidocaine patch over affected area.     INTERVAL HPI/OVERNIGHT EVENTS: NAEON. Mildly tachy 100s, SBPs 140-150s. EKG 4/1 again notable for sinus tach. Increased metoprolol from 12.5mg to 25mg BID. Patient reports feeling well this am, denies all complaints. Interviewed with assistance of  [place] [id number]. Free T4 and MMA in lab. K repleted. Continued above pain regimen. Long-term placement per PT, awaiting SW/CM setup.    MEDICATIONS  (STANDING):  acetaminophen   Tablet .. 650 milliGRAM(s) Oral every 6 hours  donepezil 5 milliGRAM(s) Oral at bedtime  enoxaparin Injectable 40 milliGRAM(s) SubCutaneous daily  escitalopram 10 milliGRAM(s) Oral daily  gabapentin 100 milliGRAM(s) Oral three times a day  hydrochlorothiazide 12.5 milliGRAM(s) Oral daily  insulin lispro (ADMELOG) corrective regimen sliding scale   SubCutaneous three times a day before meals  insulin lispro (ADMELOG) corrective regimen sliding scale   SubCutaneous at bedtime  levothyroxine 50 MICROGram(s) Oral daily  lidocaine   Patch 1 Patch Transdermal daily  melatonin 3 milliGRAM(s) Oral at bedtime  memantine 5 milliGRAM(s) Oral two times a day  metoprolol tartrate 12.5 milliGRAM(s) Oral two times a day  pantoprazole    Tablet 40 milliGRAM(s) Oral before breakfast  potassium chloride   Powder 40 milliEquivalent(s) Oral once  senna 2 Tablet(s) Oral at bedtime  simvastatin 40 milliGRAM(s) Oral at bedtime    REVIEW OF SYSTEMS:  CONSTITUTIONAL: No fever or fatigue  HEENT: denies posterior neck pain today  RESPIRATORY: No cough, no shortness of breath  CARDIOVASCULAR: No chest pain  GASTROINTESTINAL: No abdominal pain. resolved nausea, no vomiting; No diarrhea or constipation.   GENITOURINARY: No dysuria or hematuria  NEUROLOGICAL: No headaches  SKIN: No itching or rashes  all other systems reviewed and are negative     Vital Signs Last 24 Hrs  T(C): 36.8 (02 Apr 2021 05:31), Max: 36.8 (01 Apr 2021 20:15)  T(F): 98.2 (02 Apr 2021 05:31), Max: 98.2 (01 Apr 2021 20:15)  HR: 92 (02 Apr 2021 05:31) (78 - 98)  BP: 156/79 (02 Apr 2021 05:31) (147/69 - 156/79)  BP(mean): --  RR: 19 (02 Apr 2021 05:31) (18 - 20)  SpO2: 96% (02 Apr 2021 05:31) (94% - 98%)    PHYSICAL EXAMINATION:  GENERAL: NAD, appears stated age, sitting up in bed  HEAD:  Atraumatic, Normocephalic  EYES:  conjunctiva and sclera clear, eomi, perrl  NECK: Supple, TTP posteriorly  CHEST/LUNG: no increased WOB, good air movement b/l  HEART: RRR  ABDOMEN: Soft, Nontender, Nondistended; Bowel sounds present  NERVOUS SYSTEM: alert and oriented x2  EXTREMITIES:  2+ Peripheral Pulses, No edema  SKIN: warm dry                        12.8   5.30  )-----------( 292      ( 02 Apr 2021 08:17 )             38.7     04-02    137  |  98  |  22<H>  ----------------------------<  106<H>  3.4<L>   |  24  |  0.95    Ca    9.1      02 Apr 2021 08:17  Phos  3.3     04-02  Mg     2.0     04-02    TPro  7.5  /  Alb  3.8  /  TBili  0.7  /  DBili  x   /  AST  48<H>  /  ALT  38  /  AlkPhos  45  04-02    LIVER FUNCTIONS - ( 02 Apr 2021 08:17 )  Alb: 3.8 g/dL / Pro: 7.5 g/dL / ALK PHOS: 45 U/L / ALT: 38 U/L DA / AST: 48 U/L / GGT: x                   CAPILLARY BLOOD GLUCOSE      RADIOLOGY & ADDITIONAL TESTS:

## 2021-04-02 NOTE — DISCHARGE NOTE PROVIDER - CARE PROVIDER_API CALL
MELIDA MCKEON  Internal Medicine  [address] [place] [address], [address] [place] [address]  Phone: [phone]  Fax: [phone]  Established Patient  Follow Up Time: 2 weeks

## 2021-04-03 LAB
ALBUMIN SERPL ELPH-MCNC: 3.5 G/DL — SIGNIFICANT CHANGE UP (ref 3.5–5)
ALP SERPL-CCNC: 44 U/L — SIGNIFICANT CHANGE UP (ref 40–120)
ALT FLD-CCNC: 37 U/L DA — SIGNIFICANT CHANGE UP (ref 10–60)
ANION GAP SERPL CALC-SCNC: 12 MMOL/L — SIGNIFICANT CHANGE UP (ref 5–17)
AST SERPL-CCNC: 38 U/L — SIGNIFICANT CHANGE UP (ref 10–40)
BILIRUB SERPL-MCNC: 0.6 MG/DL — SIGNIFICANT CHANGE UP (ref 0.2–1.2)
BUN SERPL-MCNC: 22 MG/DL — HIGH (ref 7–18)
CALCIUM SERPL-MCNC: 9.5 MG/DL — SIGNIFICANT CHANGE UP (ref 8.4–10.5)
CHLORIDE SERPL-SCNC: 100 MMOL/L — SIGNIFICANT CHANGE UP (ref 96–108)
CO2 SERPL-SCNC: 25 MMOL/L — SIGNIFICANT CHANGE UP (ref 22–31)
CREAT SERPL-MCNC: 0.87 MG/DL — SIGNIFICANT CHANGE UP (ref 0.5–1.3)
GLUCOSE BLDC GLUCOMTR-MCNC: 118 MG/DL — HIGH (ref 70–99)
GLUCOSE BLDC GLUCOMTR-MCNC: 119 MG/DL — HIGH (ref 70–99)
GLUCOSE BLDC GLUCOMTR-MCNC: 132 MG/DL — HIGH (ref 70–99)
GLUCOSE BLDC GLUCOMTR-MCNC: 175 MG/DL — HIGH (ref 70–99)
GLUCOSE SERPL-MCNC: 120 MG/DL — HIGH (ref 70–99)
HCT VFR BLD CALC: 39.3 % — SIGNIFICANT CHANGE UP (ref 34.5–45)
HGB BLD-MCNC: 13.1 G/DL — SIGNIFICANT CHANGE UP (ref 11.5–15.5)
MAGNESIUM SERPL-MCNC: 2 MG/DL — SIGNIFICANT CHANGE UP (ref 1.6–2.6)
MCHC RBC-ENTMCNC: 28.1 PG — SIGNIFICANT CHANGE UP (ref 27–34)
MCHC RBC-ENTMCNC: 33.3 GM/DL — SIGNIFICANT CHANGE UP (ref 32–36)
MCV RBC AUTO: 84.3 FL — SIGNIFICANT CHANGE UP (ref 80–100)
NRBC # BLD: 0 /100 WBCS — SIGNIFICANT CHANGE UP (ref 0–0)
PHOSPHATE SERPL-MCNC: 3.3 MG/DL — SIGNIFICANT CHANGE UP (ref 2.5–4.5)
PLATELET # BLD AUTO: 309 K/UL — SIGNIFICANT CHANGE UP (ref 150–400)
POTASSIUM SERPL-MCNC: 3.6 MMOL/L — SIGNIFICANT CHANGE UP (ref 3.5–5.3)
POTASSIUM SERPL-SCNC: 3.6 MMOL/L — SIGNIFICANT CHANGE UP (ref 3.5–5.3)
PROT SERPL-MCNC: 7.7 G/DL — SIGNIFICANT CHANGE UP (ref 6–8.3)
RBC # BLD: 4.66 M/UL — SIGNIFICANT CHANGE UP (ref 3.8–5.2)
RBC # FLD: 14.2 % — SIGNIFICANT CHANGE UP (ref 10.3–14.5)
SODIUM SERPL-SCNC: 137 MMOL/L — SIGNIFICANT CHANGE UP (ref 135–145)
T4 FREE SERPL-MCNC: 1.35 NG/DL — SIGNIFICANT CHANGE UP
WBC # BLD: 5.36 K/UL — SIGNIFICANT CHANGE UP (ref 3.8–10.5)
WBC # FLD AUTO: 5.36 K/UL — SIGNIFICANT CHANGE UP (ref 3.8–10.5)

## 2021-04-03 RX ADMIN — DONEPEZIL HYDROCHLORIDE 5 MILLIGRAM(S): 10 TABLET, FILM COATED ORAL at 21:28

## 2021-04-03 RX ADMIN — Medication 650 MILLIGRAM(S): at 06:32

## 2021-04-03 RX ADMIN — GABAPENTIN 100 MILLIGRAM(S): 400 CAPSULE ORAL at 17:13

## 2021-04-03 RX ADMIN — Medication 650 MILLIGRAM(S): at 17:14

## 2021-04-03 RX ADMIN — Medication 650 MILLIGRAM(S): at 11:30

## 2021-04-03 RX ADMIN — Medication 650 MILLIGRAM(S): at 00:12

## 2021-04-03 RX ADMIN — LIDOCAINE 1 PATCH: 4 CREAM TOPICAL at 19:51

## 2021-04-03 RX ADMIN — SIMVASTATIN 40 MILLIGRAM(S): 20 TABLET, FILM COATED ORAL at 21:29

## 2021-04-03 RX ADMIN — Medication 1: at 12:00

## 2021-04-03 RX ADMIN — GABAPENTIN 100 MILLIGRAM(S): 400 CAPSULE ORAL at 06:32

## 2021-04-03 RX ADMIN — Medication 650 MILLIGRAM(S): at 00:45

## 2021-04-03 RX ADMIN — SENNA PLUS 2 TABLET(S): 8.6 TABLET ORAL at 21:29

## 2021-04-03 RX ADMIN — LIDOCAINE 1 PATCH: 4 CREAM TOPICAL at 11:29

## 2021-04-03 RX ADMIN — ESCITALOPRAM OXALATE 10 MILLIGRAM(S): 10 TABLET, FILM COATED ORAL at 11:29

## 2021-04-03 RX ADMIN — Medication 25 MILLIGRAM(S): at 06:33

## 2021-04-03 RX ADMIN — GABAPENTIN 100 MILLIGRAM(S): 400 CAPSULE ORAL at 21:29

## 2021-04-03 RX ADMIN — Medication 650 MILLIGRAM(S): at 07:10

## 2021-04-03 RX ADMIN — PANTOPRAZOLE SODIUM 40 MILLIGRAM(S): 20 TABLET, DELAYED RELEASE ORAL at 06:33

## 2021-04-03 RX ADMIN — MEMANTINE HYDROCHLORIDE 5 MILLIGRAM(S): 10 TABLET ORAL at 17:13

## 2021-04-03 RX ADMIN — MEMANTINE HYDROCHLORIDE 5 MILLIGRAM(S): 10 TABLET ORAL at 06:33

## 2021-04-03 RX ADMIN — Medication 3 MILLIGRAM(S): at 21:29

## 2021-04-03 RX ADMIN — Medication 50 MICROGRAM(S): at 06:32

## 2021-04-03 RX ADMIN — ENOXAPARIN SODIUM 40 MILLIGRAM(S): 100 INJECTION SUBCUTANEOUS at 11:59

## 2021-04-03 RX ADMIN — Medication 25 MILLIGRAM(S): at 17:13

## 2021-04-03 NOTE — PROGRESS NOTE ADULT - PROBLEM SELECTOR PLAN 1
PMH dementia c/b sundowning, HHA 9am-5pm x7 days, BIB niece 3/29 for NH placement given lack of HHA assistance at night  -adm UA and COVID negative  -s/p x2 doses Haldol 2mg IM and Zyprexa 2.5mg PO in ED for agitation  -vit D, folate, B12, HC, MMA wnl  -continue home memantine, donepezil, escitalopram  -melatonin for sleep while inpatient  -LTC per PT, awaiting SW/CM setup, repeat COVID 4/1 negative  -tolerating soft diet

## 2021-04-03 NOTE — PROGRESS NOTE ADULT - SUBJECTIVE AND OBJECTIVE BOX
Patient is a 86y old  Female who presents with a chief complaint of placement (02 Apr 2021 11:38)    pt seen in icu [  ], reg med floor [ x  ], bed [ ], chair at bedside [x  ], a+o x3 [ x ], lethargic [  ],    nad [ x ]        Allergies    No Known Allergies        Vitals    T(F): 98.5 (04-03-21 @ 05:37), Max: 98.5 (04-02-21 @ 20:38)  HR: 97 (04-03-21 @ 05:37) (93 - 97)  BP: 154/79 (04-03-21 @ 05:37) (150/81 - 165/79)  RR: 16 (04-03-21 @ 05:37) (16 - 16)  SpO2: 95% (04-03-21 @ 05:37) (95% - 96%)  Wt(kg): --  CAPILLARY BLOOD GLUCOSE      POCT Blood Glucose.: 116 mg/dL (02 Apr 2021 21:02)      Labs                          12.8   5.30  )-----------( 292      ( 02 Apr 2021 08:17 )             38.7       04-02    137  |  98  |  22<H>  ----------------------------<  106<H>  3.4<L>   |  24  |  0.95    Ca    9.1      02 Apr 2021 08:17  Phos  3.3     04-02  Mg     2.0     04-02    TPro  7.5  /  Alb  3.8  /  TBili  0.7  /  DBili  x   /  AST  48<H>  /  ALT  38  /  AlkPhos  45  04-02                Radiology Results      Meds    MEDICATIONS  (STANDING):  acetaminophen   Tablet .. 650 milliGRAM(s) Oral every 6 hours  donepezil 5 milliGRAM(s) Oral at bedtime  enoxaparin Injectable 40 milliGRAM(s) SubCutaneous daily  escitalopram 10 milliGRAM(s) Oral daily  gabapentin 100 milliGRAM(s) Oral three times a day  hydrochlorothiazide 12.5 milliGRAM(s) Oral daily  insulin lispro (ADMELOG) corrective regimen sliding scale   SubCutaneous three times a day before meals  insulin lispro (ADMELOG) corrective regimen sliding scale   SubCutaneous at bedtime  levothyroxine 50 MICROGram(s) Oral daily  lidocaine   Patch 1 Patch Transdermal daily  melatonin 3 milliGRAM(s) Oral at bedtime  memantine 5 milliGRAM(s) Oral two times a day  metoprolol tartrate 25 milliGRAM(s) Oral two times a day  pantoprazole    Tablet 40 milliGRAM(s) Oral before breakfast  senna 2 Tablet(s) Oral at bedtime  simvastatin 40 milliGRAM(s) Oral at bedtime      MEDICATIONS  (PRN):      Physical Exam    Neuro :  no focal deficits  Respiratory: CTA B/L  CV: RRR, S1S2, no murmurs,   Abdominal: Soft, NT, ND +BS,  Extremities: No edema, + peripheral pulses    ASSESSMENT  intractable neck pain,   h/o Diabetes Mellitus   Hypertension   HLD   dementia,   c-spine hardware        PLAN      cont pain control   pain mgmt eval noted  Maximize non-opioid pain recommendations   - Continue acetaminophen 650mg PO q 6 hours   - Continue gabapentin 100mg PO TID- pts home medication  - Lidoderm 5% patch daily.   Bowel Regimen  - Senna 2 tablets at bedtime for constipation  Mild pain   - Non-pharmacological pain treatment recommendations  - Warm/ Cool packs PRN   phys tx eval noted and rec phys tx 2-3x/week x 3 weeks at Longterm Placement  soc wk eval  case mgmt eval.  lispro ss   add lopressor 12.5 mg for elevater bp and hr  cont current meds   d/c planning for [facility] to [facility] pending auth       Patient is a 86y old  Female who presents with a chief complaint of placement (02 Apr 2021 11:38)    pt seen in icu [  ], reg med floor [ x  ], bed [ ], chair at bedside [x  ], a+o x3 [ x ], lethargic [  ],    nad [ x ]        Allergies    No Known Allergies        Vitals    T(F): 98.5 (04-03-21 @ 05:37), Max: 98.5 (04-02-21 @ 20:38)  HR: 97 (04-03-21 @ 05:37) (93 - 97)  BP: 154/79 (04-03-21 @ 05:37) (150/81 - 165/79)  RR: 16 (04-03-21 @ 05:37) (16 - 16)  SpO2: 95% (04-03-21 @ 05:37) (95% - 96%)  Wt(kg): --  CAPILLARY BLOOD GLUCOSE      POCT Blood Glucose.: 116 mg/dL (02 Apr 2021 21:02)      Labs                          12.8   5.30  )-----------( 292      ( 02 Apr 2021 08:17 )             38.7       04-02    137  |  98  |  22<H>  ----------------------------<  106<H>  3.4<L>   |  24  |  0.95    Ca    9.1      02 Apr 2021 08:17  Phos  3.3     04-02  Mg     2.0     04-02    TPro  7.5  /  Alb  3.8  /  TBili  0.7  /  DBili  x   /  AST  48<H>  /  ALT  38  /  AlkPhos  45  04-02                Radiology Results      Meds    MEDICATIONS  (STANDING):  acetaminophen   Tablet .. 650 milliGRAM(s) Oral every 6 hours  donepezil 5 milliGRAM(s) Oral at bedtime  enoxaparin Injectable 40 milliGRAM(s) SubCutaneous daily  escitalopram 10 milliGRAM(s) Oral daily  gabapentin 100 milliGRAM(s) Oral three times a day  hydrochlorothiazide 12.5 milliGRAM(s) Oral daily  insulin lispro (ADMELOG) corrective regimen sliding scale   SubCutaneous three times a day before meals  insulin lispro (ADMELOG) corrective regimen sliding scale   SubCutaneous at bedtime  levothyroxine 50 MICROGram(s) Oral daily  lidocaine   Patch 1 Patch Transdermal daily  melatonin 3 milliGRAM(s) Oral at bedtime  memantine 5 milliGRAM(s) Oral two times a day  metoprolol tartrate 25 milliGRAM(s) Oral two times a day  pantoprazole    Tablet 40 milliGRAM(s) Oral before breakfast  senna 2 Tablet(s) Oral at bedtime  simvastatin 40 milliGRAM(s) Oral at bedtime      MEDICATIONS  (PRN):      Physical Exam    Neuro :  no focal deficits  Respiratory: CTA B/L  CV: RRR, S1S2, no murmurs,   Abdominal: Soft, NT, ND +BS,  Extremities: No edema, + peripheral pulses    ASSESSMENT  intractable neck pain,   h/o Diabetes Mellitus   Hypertension   HLD   dementia,   c-spine hardware        PLAN      cont pain control   pain mgmt eval noted  Maximize non-opioid pain recommendations   - Continue acetaminophen 650mg PO q 6 hours   - Continue gabapentin 100mg PO TID- pts home medication  - Lidoderm 5% patch daily.   Bowel Regimen  - Senna 2 tablets at bedtime for constipation  Mild pain   - Non-pharmacological pain treatment recommendations  - Warm/ Cool packs PRN   phys tx eval noted and rec phys tx 2-3x/week x 3 weeks at Longterm Placement  soc wk eval  case mgmt eval.  lispro ss   cont lopressor 25 mg q12  cont current meds   d/c planning for [facility] pending auth

## 2021-04-03 NOTE — PROGRESS NOTE ADULT - PROBLEM SELECTOR PLAN 2
adm 3/29 posterior neck pain  -cervical spine hardware noted on CXR, otherwise unremarkable  -per pain management somatic, neuropathic, and chronic  -given age and comorbidities, avoid polypharmacy, IV opioids, NSAIDs, benzos   -start Tylenol 650mg PO q6 (3/30-4/4) and lidocaine patch Qd over affected area  -continue home gabapentin 100mg PO TID  -bowel regimen with senna

## 2021-04-03 NOTE — PROGRESS NOTE ADULT - PROBLEM SELECTOR PLAN 3
in ED, afebrile, SBPs 120-140s, HR 120s, SpO2 98 RA  -adm EKG sinus tach, trop x1 wnl  -repeat EKG 4/1 sinus tach  -tachy resolved from 4/3  -metoprolol 12.5mg BID started 4/1, increased 25mg BID 4/2

## 2021-04-03 NOTE — PROGRESS NOTE ADULT - SUBJECTIVE AND OBJECTIVE BOX
PGY-1 Progress Note discussed with attending    PAGER #: [[phone]] TILL 5:00 PM  PLEASE CONTACT ON CALL TEAM:  - On Call Team (Please refer to Curly) FROM 5:00 PM - 8:30PM  - Nightfloat Team FROM 8:30 -7:30 AM    CHIEF COMPLAINT & BRIEF HOSPITAL COURSE: 86F DNR/DNI from home alone, HHA 9am-5pm, PMH DM, HTN, HLD, dementia c/b sundowning, recent left-sided neck pain (recent rx gabapentin) BIB niece 3/29 for NH placement given lack of HHA assistance at night. In ED, afebrile, SBPs 120-140s, HR 120s, SpO2 98 RA. EKG sinus tach. Adm labs unremarkable, trop x1, UA, and COVID negative. Given x2 doses Haldol 2mg IM and Zyprexa 2.5mg PO in ED for agitation. Admitted to [facility] for placement.    Cervical spine hardware noted on CXR, otherwise unremarkable. Endorsed posterior neck pain, assessed by pain management to be somatic, neuropathic, and chronic. Given age and comorbidities, polypharmacy, IV opioids, NSAIDs, and benzos should be avoided. Managed with Tylenol 650mg PO q6, home gabapentin 100mg PO TID, and daily lidocaine patch over affected area.     INTERVAL HPI/OVERNIGHT EVENTS: NAEON. Tachy resolved overnight with increased dose metoprolol, BPs remain 150-160s, monitor for today given recent medication adjustment. MMA wnl. Free T4 n lab. K, P, Mg wnl this am. Continued above pain regimen. Long-term placement per PT, awaiting SW/CM setup.    MEDICATIONS  (STANDING):  acetaminophen   Tablet .. 650 milliGRAM(s) Oral every 6 hours  donepezil 5 milliGRAM(s) Oral at bedtime  enoxaparin Injectable 40 milliGRAM(s) SubCutaneous daily  escitalopram 10 milliGRAM(s) Oral daily  gabapentin 100 milliGRAM(s) Oral three times a day  hydrochlorothiazide 12.5 milliGRAM(s) Oral daily  insulin lispro (ADMELOG) corrective regimen sliding scale   SubCutaneous three times a day before meals  insulin lispro (ADMELOG) corrective regimen sliding scale   SubCutaneous at bedtime  levothyroxine 50 MICROGram(s) Oral daily  lidocaine   Patch 1 Patch Transdermal daily  melatonin 3 milliGRAM(s) Oral at bedtime  memantine 5 milliGRAM(s) Oral two times a day  metoprolol tartrate 25 milliGRAM(s) Oral two times a day  pantoprazole    Tablet 40 milliGRAM(s) Oral before breakfast  senna 2 Tablet(s) Oral at bedtime  simvastatin 40 milliGRAM(s) Oral at bedtime    REVIEW OF SYSTEMS:  CONSTITUTIONAL: No fever or fatigue  HEENT: denies posterior neck pain today  RESPIRATORY: No cough, no shortness of breath  CARDIOVASCULAR: No chest pain  GASTROINTESTINAL: No abdominal pain. resolved nausea, no vomiting; No diarrhea or constipation.   GENITOURINARY: No dysuria or hematuria  NEUROLOGICAL: No headaches  SKIN: No itching or rashes  all other systems reviewed and are negative    Vital Signs Last 24 Hrs  T(C): 36.9 (03 Apr 2021 05:37), Max: 36.9 (02 Apr 2021 20:38)  T(F): 98.5 (03 Apr 2021 05:37), Max: 98.5 (02 Apr 2021 20:38)  HR: 97 (03 Apr 2021 05:37) (93 - 97)  BP: 154/79 (03 Apr 2021 05:37) (150/81 - 165/79)  BP(mean): --  RR: 16 (03 Apr 2021 05:37) (16 - 16)  SpO2: 95% (03 Apr 2021 05:37) (95% - 96%)    PHYSICAL EXAMINATION:  GENERAL: NAD, appears stated age, sitting up in bed  HEAD:  Atraumatic, Normocephalic  EYES:  conjunctiva and sclera clear, eomi, perrl  NECK: Supple, TTP posteriorly  CHEST/LUNG: no increased WOB, good air movement b/l  HEART: RRR  ABDOMEN: Soft, Nontender, Nondistended; Bowel sounds present  NERVOUS SYSTEM: alert and oriented x2  EXTREMITIES:  2+ Peripheral Pulses, No edema  SKIN: warm dry                          13.1   5.36  )-----------( 309      ( 03 Apr 2021 06:12 )             39.3     04-03    137  |  100  |  22<H>  ----------------------------<  120<H>  3.6   |  25  |  0.87    Ca    9.5      03 Apr 2021 06:12  Phos  3.3     04-03  Mg     2.0     04-03    TPro  7.7  /  Alb  3.5  /  TBili  0.6  /  DBili  x   /  AST  38  /  ALT  37  /  AlkPhos  44  04-03    LIVER FUNCTIONS - ( 03 Apr 2021 06:12 )  Alb: 3.5 g/dL / Pro: 7.7 g/dL / ALK PHOS: 44 U/L / ALT: 37 U/L DA / AST: 38 U/L / GGT: x                   CAPILLARY BLOOD GLUCOSE      RADIOLOGY & ADDITIONAL TESTS:

## 2021-04-04 LAB
ALBUMIN SERPL ELPH-MCNC: 3.3 G/DL — LOW (ref 3.5–5)
ALP SERPL-CCNC: 39 U/L — LOW (ref 40–120)
ALT FLD-CCNC: 32 U/L DA — SIGNIFICANT CHANGE UP (ref 10–60)
ANION GAP SERPL CALC-SCNC: 9 MMOL/L — SIGNIFICANT CHANGE UP (ref 5–17)
AST SERPL-CCNC: 30 U/L — SIGNIFICANT CHANGE UP (ref 10–40)
BILIRUB SERPL-MCNC: 0.6 MG/DL — SIGNIFICANT CHANGE UP (ref 0.2–1.2)
BUN SERPL-MCNC: 37 MG/DL — HIGH (ref 7–18)
CALCIUM SERPL-MCNC: 9 MG/DL — SIGNIFICANT CHANGE UP (ref 8.4–10.5)
CHLORIDE SERPL-SCNC: 101 MMOL/L — SIGNIFICANT CHANGE UP (ref 96–108)
CO2 SERPL-SCNC: 27 MMOL/L — SIGNIFICANT CHANGE UP (ref 22–31)
CREAT SERPL-MCNC: 1.29 MG/DL — SIGNIFICANT CHANGE UP (ref 0.5–1.3)
GLUCOSE BLDC GLUCOMTR-MCNC: 102 MG/DL — HIGH (ref 70–99)
GLUCOSE BLDC GLUCOMTR-MCNC: 108 MG/DL — HIGH (ref 70–99)
GLUCOSE BLDC GLUCOMTR-MCNC: 160 MG/DL — HIGH (ref 70–99)
GLUCOSE BLDC GLUCOMTR-MCNC: 168 MG/DL — HIGH (ref 70–99)
GLUCOSE SERPL-MCNC: 98 MG/DL — SIGNIFICANT CHANGE UP (ref 70–99)
HCT VFR BLD CALC: 37.6 % — SIGNIFICANT CHANGE UP (ref 34.5–45)
HGB BLD-MCNC: 12.2 G/DL — SIGNIFICANT CHANGE UP (ref 11.5–15.5)
MAGNESIUM SERPL-MCNC: 2 MG/DL — SIGNIFICANT CHANGE UP (ref 1.6–2.6)
MCHC RBC-ENTMCNC: 27.9 PG — SIGNIFICANT CHANGE UP (ref 27–34)
MCHC RBC-ENTMCNC: 32.4 GM/DL — SIGNIFICANT CHANGE UP (ref 32–36)
MCV RBC AUTO: 86 FL — SIGNIFICANT CHANGE UP (ref 80–100)
NRBC # BLD: 0 /100 WBCS — SIGNIFICANT CHANGE UP (ref 0–0)
PHOSPHATE SERPL-MCNC: 5.2 MG/DL — HIGH (ref 2.5–4.5)
PLATELET # BLD AUTO: 295 K/UL — SIGNIFICANT CHANGE UP (ref 150–400)
POTASSIUM SERPL-MCNC: 3.6 MMOL/L — SIGNIFICANT CHANGE UP (ref 3.5–5.3)
POTASSIUM SERPL-SCNC: 3.6 MMOL/L — SIGNIFICANT CHANGE UP (ref 3.5–5.3)
PROT SERPL-MCNC: 7.2 G/DL — SIGNIFICANT CHANGE UP (ref 6–8.3)
RBC # BLD: 4.37 M/UL — SIGNIFICANT CHANGE UP (ref 3.8–5.2)
RBC # FLD: 14.3 % — SIGNIFICANT CHANGE UP (ref 10.3–14.5)
SODIUM SERPL-SCNC: 137 MMOL/L — SIGNIFICANT CHANGE UP (ref 135–145)
WBC # BLD: 5.67 K/UL — SIGNIFICANT CHANGE UP (ref 3.8–10.5)
WBC # FLD AUTO: 5.67 K/UL — SIGNIFICANT CHANGE UP (ref 3.8–10.5)

## 2021-04-04 RX ADMIN — ENOXAPARIN SODIUM 40 MILLIGRAM(S): 100 INJECTION SUBCUTANEOUS at 11:09

## 2021-04-04 RX ADMIN — ESCITALOPRAM OXALATE 10 MILLIGRAM(S): 10 TABLET, FILM COATED ORAL at 11:09

## 2021-04-04 RX ADMIN — GABAPENTIN 100 MILLIGRAM(S): 400 CAPSULE ORAL at 13:19

## 2021-04-04 RX ADMIN — LIDOCAINE 1 PATCH: 4 CREAM TOPICAL at 11:09

## 2021-04-04 RX ADMIN — Medication 650 MILLIGRAM(S): at 06:43

## 2021-04-04 RX ADMIN — Medication 1: at 11:38

## 2021-04-04 RX ADMIN — LIDOCAINE 1 PATCH: 4 CREAM TOPICAL at 19:46

## 2021-04-04 RX ADMIN — LIDOCAINE 1 PATCH: 4 CREAM TOPICAL at 00:43

## 2021-04-04 RX ADMIN — Medication 650 MILLIGRAM(S): at 00:45

## 2021-04-04 RX ADMIN — SENNA PLUS 2 TABLET(S): 8.6 TABLET ORAL at 21:17

## 2021-04-04 RX ADMIN — PANTOPRAZOLE SODIUM 40 MILLIGRAM(S): 20 TABLET, DELAYED RELEASE ORAL at 06:13

## 2021-04-04 RX ADMIN — GABAPENTIN 100 MILLIGRAM(S): 400 CAPSULE ORAL at 21:17

## 2021-04-04 RX ADMIN — Medication 3 MILLIGRAM(S): at 21:17

## 2021-04-04 RX ADMIN — MEMANTINE HYDROCHLORIDE 5 MILLIGRAM(S): 10 TABLET ORAL at 06:13

## 2021-04-04 RX ADMIN — DONEPEZIL HYDROCHLORIDE 5 MILLIGRAM(S): 10 TABLET, FILM COATED ORAL at 21:17

## 2021-04-04 RX ADMIN — Medication 650 MILLIGRAM(S): at 06:03

## 2021-04-04 RX ADMIN — Medication 25 MILLIGRAM(S): at 17:22

## 2021-04-04 RX ADMIN — Medication 25 MILLIGRAM(S): at 06:02

## 2021-04-04 RX ADMIN — SIMVASTATIN 40 MILLIGRAM(S): 20 TABLET, FILM COATED ORAL at 21:17

## 2021-04-04 RX ADMIN — Medication 50 MICROGRAM(S): at 06:02

## 2021-04-04 RX ADMIN — GABAPENTIN 100 MILLIGRAM(S): 400 CAPSULE ORAL at 06:13

## 2021-04-04 RX ADMIN — MEMANTINE HYDROCHLORIDE 5 MILLIGRAM(S): 10 TABLET ORAL at 17:22

## 2021-04-04 RX ADMIN — Medication 650 MILLIGRAM(S): at 00:04

## 2021-04-04 NOTE — PROGRESS NOTE ADULT - SUBJECTIVE AND OBJECTIVE BOX
Patient is a 86y old  Female who presents with a chief complaint of placement (03 Apr 2021 09:01)    pt seen in icu [  ], reg med floor [ x  ], bed [ ], chair at bedside [x  ], a+o x3 [ x ], lethargic [  ],    nad [ x ]      Allergies    No Known Allergies        Vitals    T(F): 97.7 (04-04-21 @ 05:54), Max: 99.2 (04-03-21 @ 13:30)  HR: 80 (04-04-21 @ 05:54) (80 - 96)  BP: 119/69 (04-04-21 @ 05:54) (119/69 - 158/75)  RR: 18 (04-04-21 @ 05:54) (18 - 18)  SpO2: 93% (04-04-21 @ 05:54) (93% - 97%)  Wt(kg): --  CAPILLARY BLOOD GLUCOSE      POCT Blood Glucose.: 132 mg/dL (03 Apr 2021 21:02)      Labs                          13.1   5.36  )-----------( 309      ( 03 Apr 2021 06:12 )             39.3       04-03    137  |  100  |  22<H>  ----------------------------<  120<H>  3.6   |  25  |  0.87    Ca    9.5      03 Apr 2021 06:12  Phos  3.3     04-03  Mg     2.0     04-03    TPro  7.7  /  Alb  3.5  /  TBili  0.6  /  DBili  x   /  AST  38  /  ALT  37  /  AlkPhos  44  04-03                Radiology Results      Meds    MEDICATIONS  (STANDING):  acetaminophen   Tablet .. 650 milliGRAM(s) Oral every 6 hours  donepezil 5 milliGRAM(s) Oral at bedtime  enoxaparin Injectable 40 milliGRAM(s) SubCutaneous daily  escitalopram 10 milliGRAM(s) Oral daily  gabapentin 100 milliGRAM(s) Oral three times a day  hydrochlorothiazide 12.5 milliGRAM(s) Oral daily  insulin lispro (ADMELOG) corrective regimen sliding scale   SubCutaneous three times a day before meals  insulin lispro (ADMELOG) corrective regimen sliding scale   SubCutaneous at bedtime  levothyroxine 50 MICROGram(s) Oral daily  lidocaine   Patch 1 Patch Transdermal daily  melatonin 3 milliGRAM(s) Oral at bedtime  memantine 5 milliGRAM(s) Oral two times a day  metoprolol tartrate 25 milliGRAM(s) Oral two times a day  pantoprazole    Tablet 40 milliGRAM(s) Oral before breakfast  senna 2 Tablet(s) Oral at bedtime  simvastatin 40 milliGRAM(s) Oral at bedtime      MEDICATIONS  (PRN):      Physical Exam    Neuro :  no focal deficits  Respiratory: CTA B/L  CV: RRR, S1S2, no murmurs,   Abdominal: Soft, NT, ND +BS,  Extremities: No edema, + peripheral pulses    ASSESSMENT  intractable neck pain,   h/o Diabetes Mellitus   Hypertension   HLD   dementia,   c-spine hardware        PLAN      cont pain control   pain mgmt eval noted  Maximize non-opioid pain recommendations   - Continue acetaminophen 650mg PO q 6 hours   - Continue gabapentin 100mg PO TID- pts home medication  - Lidoderm 5% patch daily.   Bowel Regimen  - Senna 2 tablets at bedtime for constipation  Mild pain   - Non-pharmacological pain treatment recommendations  - Warm/ Cool packs PRN   phys tx eval noted and rec phys tx 2-3x/week x 3 weeks at Longterm Placement  soc wk eval  case mgmt eval.  lispro ss   cont lopressor 25 mg q12  cont current meds   d/c planning for [facility] pending auth

## 2021-04-05 LAB
ALBUMIN SERPL ELPH-MCNC: 3.4 G/DL — LOW (ref 3.5–5)
ALP SERPL-CCNC: 39 U/L — LOW (ref 40–120)
ALT FLD-CCNC: 33 U/L DA — SIGNIFICANT CHANGE UP (ref 10–60)
ANION GAP SERPL CALC-SCNC: 9 MMOL/L — SIGNIFICANT CHANGE UP (ref 5–17)
AST SERPL-CCNC: 37 U/L — SIGNIFICANT CHANGE UP (ref 10–40)
BILIRUB SERPL-MCNC: 0.6 MG/DL — SIGNIFICANT CHANGE UP (ref 0.2–1.2)
BUN SERPL-MCNC: 47 MG/DL — HIGH (ref 7–18)
CALCIUM SERPL-MCNC: 9.2 MG/DL — SIGNIFICANT CHANGE UP (ref 8.4–10.5)
CHLORIDE SERPL-SCNC: 100 MMOL/L — SIGNIFICANT CHANGE UP (ref 96–108)
CO2 SERPL-SCNC: 28 MMOL/L — SIGNIFICANT CHANGE UP (ref 22–31)
CREAT SERPL-MCNC: 1.16 MG/DL — SIGNIFICANT CHANGE UP (ref 0.5–1.3)
GLUCOSE BLDC GLUCOMTR-MCNC: 135 MG/DL — HIGH (ref 70–99)
GLUCOSE BLDC GLUCOMTR-MCNC: 149 MG/DL — HIGH (ref 70–99)
GLUCOSE BLDC GLUCOMTR-MCNC: 155 MG/DL — HIGH (ref 70–99)
GLUCOSE BLDC GLUCOMTR-MCNC: 97 MG/DL — SIGNIFICANT CHANGE UP (ref 70–99)
GLUCOSE SERPL-MCNC: 96 MG/DL — SIGNIFICANT CHANGE UP (ref 70–99)
HCT VFR BLD CALC: 36.5 % — SIGNIFICANT CHANGE UP (ref 34.5–45)
HGB BLD-MCNC: 12 G/DL — SIGNIFICANT CHANGE UP (ref 11.5–15.5)
MAGNESIUM SERPL-MCNC: 2 MG/DL — SIGNIFICANT CHANGE UP (ref 1.6–2.6)
MCHC RBC-ENTMCNC: 28.4 PG — SIGNIFICANT CHANGE UP (ref 27–34)
MCHC RBC-ENTMCNC: 32.9 GM/DL — SIGNIFICANT CHANGE UP (ref 32–36)
MCV RBC AUTO: 86.5 FL — SIGNIFICANT CHANGE UP (ref 80–100)
NRBC # BLD: 0 /100 WBCS — SIGNIFICANT CHANGE UP (ref 0–0)
PHOSPHATE SERPL-MCNC: 4.8 MG/DL — HIGH (ref 2.5–4.5)
PLATELET # BLD AUTO: 297 K/UL — SIGNIFICANT CHANGE UP (ref 150–400)
POTASSIUM SERPL-MCNC: 3.5 MMOL/L — SIGNIFICANT CHANGE UP (ref 3.5–5.3)
POTASSIUM SERPL-SCNC: 3.5 MMOL/L — SIGNIFICANT CHANGE UP (ref 3.5–5.3)
PROT SERPL-MCNC: 7.1 G/DL — SIGNIFICANT CHANGE UP (ref 6–8.3)
RBC # BLD: 4.22 M/UL — SIGNIFICANT CHANGE UP (ref 3.8–5.2)
RBC # FLD: 14.3 % — SIGNIFICANT CHANGE UP (ref 10.3–14.5)
SARS-COV-2 RNA SPEC QL NAA+PROBE: SIGNIFICANT CHANGE UP
SODIUM SERPL-SCNC: 137 MMOL/L — SIGNIFICANT CHANGE UP (ref 135–145)
WBC # BLD: 6.41 K/UL — SIGNIFICANT CHANGE UP (ref 3.8–10.5)
WBC # FLD AUTO: 6.41 K/UL — SIGNIFICANT CHANGE UP (ref 3.8–10.5)

## 2021-04-05 RX ADMIN — LIDOCAINE 1 PATCH: 4 CREAM TOPICAL at 21:54

## 2021-04-05 RX ADMIN — PANTOPRAZOLE SODIUM 40 MILLIGRAM(S): 20 TABLET, DELAYED RELEASE ORAL at 05:46

## 2021-04-05 RX ADMIN — GABAPENTIN 100 MILLIGRAM(S): 400 CAPSULE ORAL at 13:27

## 2021-04-05 RX ADMIN — ESCITALOPRAM OXALATE 10 MILLIGRAM(S): 10 TABLET, FILM COATED ORAL at 12:01

## 2021-04-05 RX ADMIN — Medication 25 MILLIGRAM(S): at 05:45

## 2021-04-05 RX ADMIN — Medication 3 MILLIGRAM(S): at 21:54

## 2021-04-05 RX ADMIN — Medication 50 MICROGRAM(S): at 05:45

## 2021-04-05 RX ADMIN — DONEPEZIL HYDROCHLORIDE 5 MILLIGRAM(S): 10 TABLET, FILM COATED ORAL at 21:54

## 2021-04-05 RX ADMIN — GABAPENTIN 100 MILLIGRAM(S): 400 CAPSULE ORAL at 05:45

## 2021-04-05 RX ADMIN — LIDOCAINE 1 PATCH: 4 CREAM TOPICAL at 12:01

## 2021-04-05 RX ADMIN — MEMANTINE HYDROCHLORIDE 5 MILLIGRAM(S): 10 TABLET ORAL at 05:45

## 2021-04-05 RX ADMIN — Medication 1: at 11:09

## 2021-04-05 RX ADMIN — MEMANTINE HYDROCHLORIDE 5 MILLIGRAM(S): 10 TABLET ORAL at 17:13

## 2021-04-05 RX ADMIN — GABAPENTIN 100 MILLIGRAM(S): 400 CAPSULE ORAL at 21:54

## 2021-04-05 RX ADMIN — SIMVASTATIN 40 MILLIGRAM(S): 20 TABLET, FILM COATED ORAL at 21:54

## 2021-04-05 RX ADMIN — SENNA PLUS 2 TABLET(S): 8.6 TABLET ORAL at 21:54

## 2021-04-05 RX ADMIN — ENOXAPARIN SODIUM 40 MILLIGRAM(S): 100 INJECTION SUBCUTANEOUS at 12:01

## 2021-04-05 RX ADMIN — LIDOCAINE 1 PATCH: 4 CREAM TOPICAL at 00:01

## 2021-04-05 NOTE — DIETITIAN INITIAL EVALUATION ADULT. - PERTINENT MEDS FT
MEDICATIONS  (STANDING):  donepezil 5 milliGRAM(s) Oral at bedtime  enoxaparin Injectable 40 milliGRAM(s) SubCutaneous daily  escitalopram 10 milliGRAM(s) Oral daily  gabapentin 100 milliGRAM(s) Oral three times a day  hydrochlorothiazide 12.5 milliGRAM(s) Oral daily  insulin lispro (ADMELOG) corrective regimen sliding scale   SubCutaneous three times a day before meals  insulin lispro (ADMELOG) corrective regimen sliding scale   SubCutaneous at bedtime  levothyroxine 50 MICROGram(s) Oral daily  lidocaine   Patch 1 Patch Transdermal daily  melatonin 3 milliGRAM(s) Oral at bedtime  memantine 5 milliGRAM(s) Oral two times a day  metoprolol tartrate 25 milliGRAM(s) Oral two times a day  pantoprazole    Tablet 40 milliGRAM(s) Oral before breakfast  senna 2 Tablet(s) Oral at bedtime  simvastatin 40 milliGRAM(s) Oral at bedtime    MEDICATIONS  (PRN):

## 2021-04-05 NOTE — PROGRESS NOTE ADULT - SUBJECTIVE AND OBJECTIVE BOX
PGY-1 Progress Note discussed with attending    PAGER #: [[phone]] TILL 5:00 PM  PLEASE CONTACT ON CALL TEAM:  - On Call Team (Please refer to Curly) FROM 5:00 PM - 8:30PM  - Nightfloat Team FROM 8:30 -7:30 AM    CHIEF COMPLAINT & BRIEF HOSPITAL COURSE:    INTERVAL HPI/OVERNIGHT EVENTS:       REVIEW OF SYSTEMS:  CONSTITUTIONAL: No fever, weight loss, or fatigue  RESPIRATORY: No cough, wheezing, chills or hemoptysis; No shortness of breath  CARDIOVASCULAR: No chest pain, palpitations, dizziness, or leg swelling  GASTROINTESTINAL: No abdominal pain. No nausea, vomiting, or hematemesis; No diarrhea or constipation. No melena or hematochezia.  GENITOURINARY: No dysuria or hematuria, urinary frequency  NEUROLOGICAL: No headaches, memory loss, loss of strength, numbness, or tremors  SKIN: No itching, burning, rashes, or lesions     Vital Signs Last 24 Hrs  T(C): 36.8 (05 Apr 2021 05:07), Max: 37 (04 Apr 2021 20:18)  T(F): 98.3 (05 Apr 2021 05:07), Max: 98.6 (04 Apr 2021 20:18)  HR: 83 (05 Apr 2021 05:45) (73 - 91)  BP: 145/71 (05 Apr 2021 05:45) (108/72 - 145/71)  BP(mean): --  RR: 18 (05 Apr 2021 05:07) (17 - 18)  SpO2: 95% (05 Apr 2021 05:07) (93% - 95%)    PHYSICAL EXAMINATION:  GENERAL: NAD, well built  HEAD:  Atraumatic, Normocephalic  EYES:  conjunctiva and sclera clear  NECK: Supple, No JVD, Normal thyroid  CHEST/LUNG: Clear to auscultation. Clear to percussion bilaterally; No rales, rhonchi, wheezing, or rubs  HEART: Regular rate and rhythm; No murmurs, rubs, or gallops  ABDOMEN: Soft, Nontender, Nondistended; Bowel sounds present  NERVOUS SYSTEM:  Alert & Oriented X3,    EXTREMITIES:  2+ Peripheral Pulses, No clubbing, cyanosis, or edema  SKIN: warm dry                          12.0   6.41  )-----------( 297      ( 05 Apr 2021 07:30 )             36.5     04-05    137  |  100  |  47<H>  ----------------------------<  96  3.5   |  28  |  1.16    Ca    9.2      05 Apr 2021 07:30  Phos  4.8     04-05  Mg     2.0     04-05    TPro  7.1  /  Alb  3.4<L>  /  TBili  0.6  /  DBili  x   /  AST  37  /  ALT  33  /  AlkPhos  39<L>  04-05    LIVER FUNCTIONS - ( 05 Apr 2021 07:30 )  Alb: 3.4 g/dL / Pro: 7.1 g/dL / ALK PHOS: 39 U/L / ALT: 33 U/L DA / AST: 37 U/L / GGT: x                   CAPILLARY BLOOD GLUCOSE      RADIOLOGY & ADDITIONAL TESTS:                   PGY-1 Progress Note discussed with attending    PAGER #: [[phone]] TILL 5:00 PM  PLEASE CONTACT ON CALL TEAM:  - On Call Team (Please refer to Curly) FROM 5:00 PM - 8:30PM  - Nightfloat Team FROM 8:30 -7:30 AM    CHIEF COMPLAINT & BRIEF HOSPITAL COURSE: 86F DNR/DNI from home alone, HHA 9am-5pm, PMH DM, HTN, HLD, dementia c/b sundowning, recent left-sided neck pain (recent rx gabapentin) BIB niece 3/29 for NH placement given lack of HHA assistance at night. In ED, afebrile, SBPs 120-140s, HR 120s, SpO2 98 RA. EKG sinus tach. Adm labs unremarkable, trop x1, UA, and COVID negative. Given x2 doses Haldol 2mg IM and Zyprexa 2.5mg PO in ED for agitation. Admitted to [facility] for placement.    Cervical spine hardware noted on CXR, otherwise unremarkable. Endorsed posterior neck pain, assessed by pain management to be somatic, neuropathic, and chronic. Given age and comorbidities, polypharmacy, IV opioids, NSAIDs, and benzos should be avoided. Managed with Tylenol 650mg PO q6, home gabapentin 100mg PO TID, and daily lidocaine patch over affected area. Pending placement     INTERVAL HPI/OVERNIGHT EVENTS: No active event overnight. Pt seen at bedside this morning. Complained of some neck pain. AXO2 oriented with place and person. Pending placement. Pt is medically stable to be discharged.     REVIEW OF SYSTEMS:  CONSTITUTIONAL: No fever, weight loss, or fatigue  RESPIRATORY: No cough, wheezing, chills or hemoptysis; No shortness of breath  CARDIOVASCULAR: No chest pain, palpitations, dizziness, or leg swelling  GASTROINTESTINAL: No abdominal pain. No nausea, vomiting, or hematemesis; No diarrhea or constipation. No melena or hematochezia.  GENITOURINARY: No dysuria or hematuria, urinary frequency  NEUROLOGICAL: No headaches, memory loss, loss of strength, numbness, or tremors  Neck pain +ve  SKIN: No itching, burning, rashes, or lesions     Vital Signs Last 24 Hrs  T(C): 36.8 (05 Apr 2021 05:07), Max: 37 (04 Apr 2021 20:18)  T(F): 98.3 (05 Apr 2021 05:07), Max: 98.6 (04 Apr 2021 20:18)  HR: 83 (05 Apr 2021 05:45) (73 - 91)  BP: 145/71 (05 Apr 2021 05:45) (108/72 - 145/71)  BP(mean): --  RR: 18 (05 Apr 2021 05:07) (17 - 18)  SpO2: 95% (05 Apr 2021 05:07) (93% - 95%)    PHYSICAL EXAMINATION:  GENERAL: NAD, appears comfortable   HEAD:  Atraumatic, Normocephalic  EYES:  conjunctiva and sclera clear  NECK: Supple, No JVD, Normal thyroid  CHEST/LUNG: Clear to auscultation. Clear to percussion bilaterally; No rales, rhonchi, wheezing, or rubs  HEART: Regular rate and rhythm; No murmurs, rubs, or gallops  ABDOMEN: Soft, Nontender, Nondistended; Bowel sounds present  NERVOUS SYSTEM:  Alert & Oriented X2,    EXTREMITIES:  2+ Peripheral Pulses, No clubbing, cyanosis, or edema  SKIN: warm dry    MEDICATIONS  (STANDING):  donepezil 5 milliGRAM(s) Oral at bedtime  enoxaparin Injectable 40 milliGRAM(s) SubCutaneous daily  escitalopram 10 milliGRAM(s) Oral daily  gabapentin 100 milliGRAM(s) Oral three times a day  hydrochlorothiazide 12.5 milliGRAM(s) Oral daily  insulin lispro (ADMELOG) corrective regimen sliding scale   SubCutaneous three times a day before meals  insulin lispro (ADMELOG) corrective regimen sliding scale   SubCutaneous at bedtime  levothyroxine 50 MICROGram(s) Oral daily  lidocaine   Patch 1 Patch Transdermal daily  melatonin 3 milliGRAM(s) Oral at bedtime  memantine 5 milliGRAM(s) Oral two times a day  metoprolol tartrate 25 milliGRAM(s) Oral two times a day  pantoprazole    Tablet 40 milliGRAM(s) Oral before breakfast  senna 2 Tablet(s) Oral at bedtime  simvastatin 40 milliGRAM(s) Oral at bedtime    MEDICATIONS  (PRN):                            12.0   6.41  )-----------( 297      ( 05 Apr 2021 07:30 )             36.5     04-05    137  |  100  |  47<H>  ----------------------------<  96  3.5   |  28  |  1.16    Ca    9.2      05 Apr 2021 07:30  Phos  4.8     04-05  Mg     2.0     04-05    TPro  7.1  /  Alb  3.4<L>  /  TBili  0.6  /  DBili  x   /  AST  37  /  ALT  33  /  AlkPhos  39<L>  04-05    LIVER FUNCTIONS - ( 05 Apr 2021 07:30 )  Alb: 3.4 g/dL / Pro: 7.1 g/dL / ALK PHOS: 39 U/L / ALT: 33 U/L DA / AST: 37 U/L / GGT: x                   CAPILLARY BLOOD GLUCOSE      RADIOLOGY & ADDITIONAL TESTS:                   PGY-1 Progress Note discussed with attending    PAGER #: [[phone]] TILL 5:00 PM  PLEASE CONTACT ON CALL TEAM:  - On Call Team (Please refer to Curly) FROM 5:00 PM - 8:30PM  - Nightfloat Team FROM 8:30 -7:30 AM    CHIEF COMPLAINT & BRIEF HOSPITAL COURSE: 86F DNR/DNI from home alone, HHA 9am-5pm, PMH DM, HTN, HLD, dementia c/b sundowning, recent left-sided neck pain (recent rx gabapentin) BIB niece 3/29 for NH placement given lack of HHA assistance at night. In ED, afebrile, SBPs 120-140s, HR 120s, SpO2 98 RA. EKG sinus tach. Adm labs unremarkable, trop x1, UA, and COVID negative. Given x2 doses Haldol 2mg IM and Zyprexa 2.5mg PO in ED for agitation. Admitted to [facility] for placement.    Cervical spine hardware noted on CXR, otherwise unremarkable. Endorsed posterior neck pain, assessed by pain management to be somatic, neuropathic, and chronic. Given age and comorbidities, polypharmacy, IV opioids, NSAIDs, and benzos should be avoided. Managed with Tylenol 650mg PO q6, home gabapentin 100mg PO TID, and daily lidocaine patch over affected area. Pending placement     INTERVAL HPI/OVERNIGHT EVENTS: No active event overnight. Pt seen at bedside this morning. Spoke to video  ID # [id number].Complained of some neck pain. AXO2 oriented with place and person. Pending placement. Pt is medically stable to be discharged.     REVIEW OF SYSTEMS:  CONSTITUTIONAL: No fever, weight loss, or fatigue  RESPIRATORY: No cough, wheezing, chills or hemoptysis; No shortness of breath  CARDIOVASCULAR: No chest pain, palpitations, dizziness, or leg swelling  GASTROINTESTINAL: No abdominal pain. No nausea, vomiting, or hematemesis; No diarrhea or constipation. No melena or hematochezia.  GENITOURINARY: No dysuria or hematuria, urinary frequency  NEUROLOGICAL: No headaches, memory loss, loss of strength, numbness, or tremors  Neck pain +ve  SKIN: No itching, burning, rashes, or lesions     Vital Signs Last 24 Hrs  T(C): 36.8 (05 Apr 2021 05:07), Max: 37 (04 Apr 2021 20:18)  T(F): 98.3 (05 Apr 2021 05:07), Max: 98.6 (04 Apr 2021 20:18)  HR: 83 (05 Apr 2021 05:45) (73 - 91)  BP: 145/71 (05 Apr 2021 05:45) (108/72 - 145/71)  BP(mean): --  RR: 18 (05 Apr 2021 05:07) (17 - 18)  SpO2: 95% (05 Apr 2021 05:07) (93% - 95%)    PHYSICAL EXAMINATION:  GENERAL: NAD, appears comfortable   HEAD:  Atraumatic, Normocephalic  EYES:  conjunctiva and sclera clear  NECK: Supple, No JVD, Normal thyroid  CHEST/LUNG: Clear to auscultation. Clear to percussion bilaterally; No rales, rhonchi, wheezing, or rubs  HEART: Regular rate and rhythm; No murmurs, rubs, or gallops  ABDOMEN: Soft, Nontender, Nondistended; Bowel sounds present  NERVOUS SYSTEM:  Alert & Oriented X2,    EXTREMITIES:  2+ Peripheral Pulses, No clubbing, cyanosis, or edema  SKIN: warm dry    MEDICATIONS  (STANDING):  donepezil 5 milliGRAM(s) Oral at bedtime  enoxaparin Injectable 40 milliGRAM(s) SubCutaneous daily  escitalopram 10 milliGRAM(s) Oral daily  gabapentin 100 milliGRAM(s) Oral three times a day  hydrochlorothiazide 12.5 milliGRAM(s) Oral daily  insulin lispro (ADMELOG) corrective regimen sliding scale   SubCutaneous three times a day before meals  insulin lispro (ADMELOG) corrective regimen sliding scale   SubCutaneous at bedtime  levothyroxine 50 MICROGram(s) Oral daily  lidocaine   Patch 1 Patch Transdermal daily  melatonin 3 milliGRAM(s) Oral at bedtime  memantine 5 milliGRAM(s) Oral two times a day  metoprolol tartrate 25 milliGRAM(s) Oral two times a day  pantoprazole    Tablet 40 milliGRAM(s) Oral before breakfast  senna 2 Tablet(s) Oral at bedtime  simvastatin 40 milliGRAM(s) Oral at bedtime    MEDICATIONS  (PRN):                            12.0   6.41  )-----------( 297      ( 05 Apr 2021 07:30 )             36.5     04-05    137  |  100  |  47<H>  ----------------------------<  96  3.5   |  28  |  1.16    Ca    9.2      05 Apr 2021 07:30  Phos  4.8     04-05  Mg     2.0     04-05    TPro  7.1  /  Alb  3.4<L>  /  TBili  0.6  /  DBili  x   /  AST  37  /  ALT  33  /  AlkPhos  39<L>  04-05    LIVER FUNCTIONS - ( 05 Apr 2021 07:30 )  Alb: 3.4 g/dL / Pro: 7.1 g/dL / ALK PHOS: 39 U/L / ALT: 33 U/L DA / AST: 37 U/L / GGT: x                   CAPILLARY BLOOD GLUCOSE      RADIOLOGY & ADDITIONAL TESTS:

## 2021-04-05 NOTE — DIETITIAN INITIAL EVALUATION ADULT. - PERTINENT LABORATORY DATA
04-05 Na137 mmol/L Glu 96 mg/dL K+ 3.5 mmol/L Cr  1.16 mg/dL BUN 47 mg/dL<H> 04-05 Phos 4.8 mg/dL<H> 04-05 Alb 3.4 g/dL<L> 03-30 Chol 272 mg/dL<H> LDL --    HDL 57 mg/dL Trig 111 mg/dL

## 2021-04-05 NOTE — DIETITIAN INITIAL EVALUATION ADULT. - OTHER INFO
pt from home, lives alone. family admitted to find placement for pt, neck pain. pmh dementia, sundowning, dm-a1c 6.8, htn, hld. pt visited. unable to interview pt given mental status. per RN good po intake. no gi distress noted. pt is pending auth for d/c to LTC.

## 2021-04-05 NOTE — PROGRESS NOTE ADULT - PROBLEM SELECTOR PLAN 4
-continue home Synthroid  -adm TSH 5.4, repeat wnl  -fu free T4 -continue home Synthroid  -adm TSH 5.4, repeat wnl  -free T4 1.35

## 2021-04-05 NOTE — PROGRESS NOTE ADULT - SUBJECTIVE AND OBJECTIVE BOX
Patient is a 86y old  Female who presents with a chief complaint of placement (04 Apr 2021 06:12)    pt seen in icu [  ], reg med floor [ x  ], bed [ ], chair at bedside [x  ], a+o x3 [ x ], lethargic [  ],    nad [ x ]      Allergies    No Known Allergies        Vitals    T(F): 98.3 (04-05-21 @ 05:07), Max: 98.6 (04-04-21 @ 20:18)  HR: 83 (04-05-21 @ 05:45) (66 - 91)  BP: 145/71 (04-05-21 @ 05:45) (106/58 - 145/71)  RR: 18 (04-05-21 @ 05:07) (17 - 18)  SpO2: 95% (04-05-21 @ 05:07) (93% - 95%)  Wt(kg): --  CAPILLARY BLOOD GLUCOSE      POCT Blood Glucose.: 168 mg/dL (04 Apr 2021 21:01)      Labs                          12.2   5.67  )-----------( 295      ( 04 Apr 2021 06:50 )             37.6       04-04    137  |  101  |  37<H>  ----------------------------<  98  3.6   |  27  |  1.29    Ca    9.0      04 Apr 2021 06:50  Phos  5.2     04-04  Mg     2.0     04-04    TPro  7.2  /  Alb  3.3<L>  /  TBili  0.6  /  DBili  x   /  AST  30  /  ALT  32  /  AlkPhos  39<L>  04-04                Radiology Results      Meds    MEDICATIONS  (STANDING):  donepezil 5 milliGRAM(s) Oral at bedtime  enoxaparin Injectable 40 milliGRAM(s) SubCutaneous daily  escitalopram 10 milliGRAM(s) Oral daily  gabapentin 100 milliGRAM(s) Oral three times a day  hydrochlorothiazide 12.5 milliGRAM(s) Oral daily  insulin lispro (ADMELOG) corrective regimen sliding scale   SubCutaneous three times a day before meals  insulin lispro (ADMELOG) corrective regimen sliding scale   SubCutaneous at bedtime  levothyroxine 50 MICROGram(s) Oral daily  lidocaine   Patch 1 Patch Transdermal daily  melatonin 3 milliGRAM(s) Oral at bedtime  memantine 5 milliGRAM(s) Oral two times a day  metoprolol tartrate 25 milliGRAM(s) Oral two times a day  pantoprazole    Tablet 40 milliGRAM(s) Oral before breakfast  senna 2 Tablet(s) Oral at bedtime  simvastatin 40 milliGRAM(s) Oral at bedtime      MEDICATIONS  (PRN):      Physical Exam    Neuro :  no focal deficits  Respiratory: CTA B/L  CV: RRR, S1S2, no murmurs,   Abdominal: Soft, NT, ND +BS,  Extremities: No edema, + peripheral pulses    ASSESSMENT  intractable neck pain,   h/o Diabetes Mellitus   Hypertension   HLD   dementia,   c-spine hardware        PLAN      cont pain control   pain mgmt eval noted  Maximize non-opioid pain recommendations   - Continue acetaminophen 650mg PO q 6 hours   - Continue gabapentin 100mg PO TID- pts home medication  - Lidoderm 5% patch daily.   Bowel Regimen  - Senna 2 tablets at bedtime for constipation  Mild pain   - Non-pharmacological pain treatment recommendations  - Warm/ Cool packs PRN   phys tx eval noted and rec phys tx 2-3x/week x 3 weeks at Longterm Placement  soc wk eval  case mgmt eval.  lispro ss   cont lopressor 25 mg q12  cont current meds   d/c planning for ltc to [facility] pending auth

## 2021-04-05 NOTE — DIETITIAN INITIAL EVALUATION ADULT. - +GENDER
Pt in milieu intermittently throughout shift; mood and affect fair; cooperative and compliant, insight into illness moderate, goal oriented - \"to move on from past issues and strive for success\"; interaction, sleep, hygiene and appetite wnl, no behavioral issues during shift. Denies SI, HI, and AVH; involved in no falls this shift - skid resistant footwear utilized and floor kept free of items that could precipitate a fall. Statement Selected

## 2021-04-06 LAB
GLUCOSE BLDC GLUCOMTR-MCNC: 106 MG/DL — HIGH (ref 70–99)
GLUCOSE BLDC GLUCOMTR-MCNC: 174 MG/DL — HIGH (ref 70–99)
GLUCOSE BLDC GLUCOMTR-MCNC: 182 MG/DL — HIGH (ref 70–99)
GLUCOSE BLDC GLUCOMTR-MCNC: 184 MG/DL — HIGH (ref 70–99)
GLUCOSE BLDC GLUCOMTR-MCNC: 215 MG/DL — HIGH (ref 70–99)

## 2021-04-06 RX ADMIN — Medication 25 MILLIGRAM(S): at 17:33

## 2021-04-06 RX ADMIN — Medication 1: at 17:05

## 2021-04-06 RX ADMIN — Medication 25 MILLIGRAM(S): at 06:10

## 2021-04-06 RX ADMIN — ESCITALOPRAM OXALATE 10 MILLIGRAM(S): 10 TABLET, FILM COATED ORAL at 12:47

## 2021-04-06 RX ADMIN — DONEPEZIL HYDROCHLORIDE 5 MILLIGRAM(S): 10 TABLET, FILM COATED ORAL at 21:45

## 2021-04-06 RX ADMIN — LIDOCAINE 1 PATCH: 4 CREAM TOPICAL at 19:32

## 2021-04-06 RX ADMIN — Medication 1: at 12:33

## 2021-04-06 RX ADMIN — LIDOCAINE 1 PATCH: 4 CREAM TOPICAL at 00:00

## 2021-04-06 RX ADMIN — SENNA PLUS 2 TABLET(S): 8.6 TABLET ORAL at 21:45

## 2021-04-06 RX ADMIN — GABAPENTIN 100 MILLIGRAM(S): 400 CAPSULE ORAL at 17:36

## 2021-04-06 RX ADMIN — LIDOCAINE 1 PATCH: 4 CREAM TOPICAL at 12:47

## 2021-04-06 RX ADMIN — Medication 3 MILLIGRAM(S): at 21:45

## 2021-04-06 RX ADMIN — Medication 0: at 21:27

## 2021-04-06 RX ADMIN — LIDOCAINE 1 PATCH: 4 CREAM TOPICAL at 23:10

## 2021-04-06 RX ADMIN — MEMANTINE HYDROCHLORIDE 5 MILLIGRAM(S): 10 TABLET ORAL at 06:10

## 2021-04-06 RX ADMIN — PANTOPRAZOLE SODIUM 40 MILLIGRAM(S): 20 TABLET, DELAYED RELEASE ORAL at 06:11

## 2021-04-06 RX ADMIN — ENOXAPARIN SODIUM 40 MILLIGRAM(S): 100 INJECTION SUBCUTANEOUS at 12:47

## 2021-04-06 RX ADMIN — MEMANTINE HYDROCHLORIDE 5 MILLIGRAM(S): 10 TABLET ORAL at 17:34

## 2021-04-06 RX ADMIN — GABAPENTIN 100 MILLIGRAM(S): 400 CAPSULE ORAL at 21:45

## 2021-04-06 RX ADMIN — GABAPENTIN 100 MILLIGRAM(S): 400 CAPSULE ORAL at 06:10

## 2021-04-06 RX ADMIN — Medication 50 MICROGRAM(S): at 06:10

## 2021-04-06 RX ADMIN — SIMVASTATIN 40 MILLIGRAM(S): 20 TABLET, FILM COATED ORAL at 21:45

## 2021-04-06 NOTE — PROGRESS NOTE ADULT - SUBJECTIVE AND OBJECTIVE BOX
Patient is a 86y old  Female who presents with a chief complaint of placement (05 Apr 2021 16:29)    pt seen in icu [  ], reg med floor [ x  ], bed [ ], chair at bedside [x  ], a+o x3 [ x ], lethargic [  ],    nad [ x ]      Allergies    No Known Allergies        Vitals    T(F): 98.4 (04-06-21 @ 05:09), Max: 99.4 (04-05-21 @ 20:09)  HR: 98 (04-06-21 @ 05:09) (81 - 98)  BP: 125/68 (04-06-21 @ 05:09) (96/48 - 125/68)  RR: 19 (04-06-21 @ 05:09) (16 - 19)  SpO2: 95% (04-06-21 @ 05:09) (95% - 97%)  Wt(kg): --  CAPILLARY BLOOD GLUCOSE      POCT Blood Glucose.: 149 mg/dL (05 Apr 2021 20:55)      Labs                          12.0   6.41  )-----------( 297      ( 05 Apr 2021 07:30 )             36.5       04-05    137  |  100  |  47<H>  ----------------------------<  96  3.5   |  28  |  1.16    Ca    9.2      05 Apr 2021 07:30  Phos  4.8     04-05  Mg     2.0     04-05    TPro  7.1  /  Alb  3.4<L>  /  TBili  0.6  /  DBili  x   /  AST  37  /  ALT  33  /  AlkPhos  39<L>  04-05                Radiology Results      Meds    MEDICATIONS  (STANDING):  donepezil 5 milliGRAM(s) Oral at bedtime  enoxaparin Injectable 40 milliGRAM(s) SubCutaneous daily  escitalopram 10 milliGRAM(s) Oral daily  gabapentin 100 milliGRAM(s) Oral three times a day  hydrochlorothiazide 12.5 milliGRAM(s) Oral daily  insulin lispro (ADMELOG) corrective regimen sliding scale   SubCutaneous three times a day before meals  insulin lispro (ADMELOG) corrective regimen sliding scale   SubCutaneous at bedtime  levothyroxine 50 MICROGram(s) Oral daily  lidocaine   Patch 1 Patch Transdermal daily  melatonin 3 milliGRAM(s) Oral at bedtime  memantine 5 milliGRAM(s) Oral two times a day  metoprolol tartrate 25 milliGRAM(s) Oral two times a day  pantoprazole    Tablet 40 milliGRAM(s) Oral before breakfast  senna 2 Tablet(s) Oral at bedtime  simvastatin 40 milliGRAM(s) Oral at bedtime      MEDICATIONS  (PRN):      Physical Exam    Neuro :  no focal deficits  Respiratory: CTA B/L  CV: RRR, S1S2, no murmurs,   Abdominal: Soft, NT, ND +BS,  Extremities: No edema, + peripheral pulses    ASSESSMENT  intractable neck pain,   h/o Diabetes Mellitus   Hypertension   HLD   dementia,   c-spine hardware        PLAN      cont pain control   pain mgmt eval noted  Maximize non-opioid pain recommendations   - Continue acetaminophen 650mg PO q 6 hours   - Continue gabapentin 100mg PO TID- pts home medication  - Lidoderm 5% patch daily.   Bowel Regimen  - Senna 2 tablets at bedtime for constipation  Mild pain   - Non-pharmacological pain treatment recommendations  - Warm/ Cool packs PRN   phys tx eval noted and rec phys tx 2-3x/week x 3 weeks at Longterm Placement  soc wk eval  case mgmt eval.  lispro ss   cont lopressor 25 mg q12  cont current meds   d/c planning for ltc [facility] pending auth

## 2021-04-06 NOTE — PROGRESS NOTE ADULT - SUBJECTIVE AND OBJECTIVE BOX
PGY-1 Progress Note discussed with attending    PAGER #: [[phone]] TILL 5:00 PM  PLEASE CONTACT ON CALL TEAM:  - On Call Team (Please refer to Curly) FROM 5:00 PM - 8:30PM  - Nightfloat Team FROM 8:30 -7:30 AM    CHIEF COMPLAINT & BRIEF HOSPITAL COURSE:    INTERVAL HPI/OVERNIGHT EVENTS:       REVIEW OF SYSTEMS:  CONSTITUTIONAL: No fever, weight loss, or fatigue  RESPIRATORY: No cough, wheezing, chills or hemoptysis; No shortness of breath  CARDIOVASCULAR: No chest pain, palpitations, dizziness, or leg swelling  GASTROINTESTINAL: No abdominal pain. No nausea, vomiting, or hematemesis; No diarrhea or constipation. No melena or hematochezia.  GENITOURINARY: No dysuria or hematuria, urinary frequency  NEUROLOGICAL: No headaches, memory loss, loss of strength, numbness, or tremors  SKIN: No itching, burning, rashes, or lesions     Vital Signs Last 24 Hrs  T(C): 36.9 (06 Apr 2021 05:09), Max: 37.4 (05 Apr 2021 20:09)  T(F): 98.4 (06 Apr 2021 05:09), Max: 99.4 (05 Apr 2021 20:09)  HR: 98 (06 Apr 2021 05:09) (81 - 98)  BP: 125/68 (06 Apr 2021 05:09) (96/48 - 125/68)  BP(mean): --  RR: 19 (06 Apr 2021 05:09) (16 - 19)  SpO2: 95% (06 Apr 2021 05:09) (95% - 97%)    PHYSICAL EXAMINATION:  GENERAL: NAD, well built  HEAD:  Atraumatic, Normocephalic  EYES:  conjunctiva and sclera clear  NECK: Supple, No JVD, Normal thyroid  CHEST/LUNG: Clear to auscultation. Clear to percussion bilaterally; No rales, rhonchi, wheezing, or rubs  HEART: Regular rate and rhythm; No murmurs, rubs, or gallops  ABDOMEN: Soft, Nontender, Nondistended; Bowel sounds present  NERVOUS SYSTEM:  Alert & Oriented X3,    EXTREMITIES:  2+ Peripheral Pulses, No clubbing, cyanosis, or edema  SKIN: warm dry                          12.0   6.41  )-----------( 297      ( 05 Apr 2021 07:30 )             36.5     04-05    137  |  100  |  47<H>  ----------------------------<  96  3.5   |  28  |  1.16    Ca    9.2      05 Apr 2021 07:30  Phos  4.8     04-05  Mg     2.0     04-05    TPro  7.1  /  Alb  3.4<L>  /  TBili  0.6  /  DBili  x   /  AST  37  /  ALT  33  /  AlkPhos  39<L>  04-05    LIVER FUNCTIONS - ( 05 Apr 2021 07:30 )  Alb: 3.4 g/dL / Pro: 7.1 g/dL / ALK PHOS: 39 U/L / ALT: 33 U/L DA / AST: 37 U/L / GGT: x                   CAPILLARY BLOOD GLUCOSE      RADIOLOGY & ADDITIONAL TESTS:

## 2021-04-07 ENCOUNTER — TRANSCRIPTION ENCOUNTER (OUTPATIENT)
Age: 86
End: 2021-04-07

## 2021-04-07 VITALS — DIASTOLIC BLOOD PRESSURE: 67 MMHG | HEART RATE: 84 BPM | SYSTOLIC BLOOD PRESSURE: 94 MMHG

## 2021-04-07 LAB
GLUCOSE BLDC GLUCOMTR-MCNC: 137 MG/DL — HIGH (ref 70–99)
GLUCOSE BLDC GLUCOMTR-MCNC: 143 MG/DL — HIGH (ref 70–99)
GLUCOSE BLDC GLUCOMTR-MCNC: 168 MG/DL — HIGH (ref 70–99)

## 2021-04-07 PROCEDURE — 84439 ASSAY OF FREE THYROXINE: CPT

## 2021-04-07 PROCEDURE — 82306 VITAMIN D 25 HYDROXY: CPT

## 2021-04-07 PROCEDURE — 80053 COMPREHEN METABOLIC PANEL: CPT

## 2021-04-07 PROCEDURE — 99285 EMERGENCY DEPT VISIT HI MDM: CPT | Mod: 25

## 2021-04-07 PROCEDURE — 85730 THROMBOPLASTIN TIME PARTIAL: CPT

## 2021-04-07 PROCEDURE — 84100 ASSAY OF PHOSPHORUS: CPT

## 2021-04-07 PROCEDURE — 82550 ASSAY OF CK (CPK): CPT

## 2021-04-07 PROCEDURE — 36415 COLL VENOUS BLD VENIPUNCTURE: CPT

## 2021-04-07 PROCEDURE — 85025 COMPLETE CBC W/AUTO DIFF WBC: CPT

## 2021-04-07 PROCEDURE — 86769 SARS-COV-2 COVID-19 ANTIBODY: CPT

## 2021-04-07 PROCEDURE — 87635 SARS-COV-2 COVID-19 AMP PRB: CPT

## 2021-04-07 PROCEDURE — 82962 GLUCOSE BLOOD TEST: CPT

## 2021-04-07 PROCEDURE — 83036 HEMOGLOBIN GLYCOSYLATED A1C: CPT

## 2021-04-07 PROCEDURE — 83090 ASSAY OF HOMOCYSTEINE: CPT

## 2021-04-07 PROCEDURE — 85610 PROTHROMBIN TIME: CPT

## 2021-04-07 PROCEDURE — 84443 ASSAY THYROID STIM HORMONE: CPT

## 2021-04-07 PROCEDURE — 84484 ASSAY OF TROPONIN QUANT: CPT

## 2021-04-07 PROCEDURE — 80061 LIPID PANEL: CPT

## 2021-04-07 PROCEDURE — 83921 ORGANIC ACID SINGLE QUANT: CPT

## 2021-04-07 PROCEDURE — 82607 VITAMIN B-12: CPT

## 2021-04-07 PROCEDURE — 83735 ASSAY OF MAGNESIUM: CPT

## 2021-04-07 PROCEDURE — 82746 ASSAY OF FOLIC ACID SERUM: CPT

## 2021-04-07 PROCEDURE — 81001 URINALYSIS AUTO W/SCOPE: CPT

## 2021-04-07 PROCEDURE — 93005 ELECTROCARDIOGRAM TRACING: CPT

## 2021-04-07 PROCEDURE — 85027 COMPLETE CBC AUTOMATED: CPT

## 2021-04-07 PROCEDURE — 71045 X-RAY EXAM CHEST 1 VIEW: CPT

## 2021-04-07 PROCEDURE — 97162 PT EVAL MOD COMPLEX 30 MIN: CPT

## 2021-04-07 RX ORDER — METOPROLOL TARTRATE 50 MG
25 TABLET ORAL
Refills: 0 | Status: DISCONTINUED | OUTPATIENT
Start: 2021-04-07 | End: 2021-04-07

## 2021-04-07 RX ORDER — ACETAMINOPHEN 500 MG
650 TABLET ORAL EVERY 6 HOURS
Refills: 0 | Status: DISCONTINUED | OUTPATIENT
Start: 2021-04-07 | End: 2021-04-07

## 2021-04-07 RX ADMIN — ESCITALOPRAM OXALATE 10 MILLIGRAM(S): 10 TABLET, FILM COATED ORAL at 12:25

## 2021-04-07 RX ADMIN — LIDOCAINE 1 PATCH: 4 CREAM TOPICAL at 12:25

## 2021-04-07 RX ADMIN — ENOXAPARIN SODIUM 40 MILLIGRAM(S): 100 INJECTION SUBCUTANEOUS at 12:25

## 2021-04-07 RX ADMIN — PANTOPRAZOLE SODIUM 40 MILLIGRAM(S): 20 TABLET, DELAYED RELEASE ORAL at 05:17

## 2021-04-07 RX ADMIN — GABAPENTIN 100 MILLIGRAM(S): 400 CAPSULE ORAL at 05:17

## 2021-04-07 RX ADMIN — Medication 25 MILLIGRAM(S): at 07:32

## 2021-04-07 RX ADMIN — MEMANTINE HYDROCHLORIDE 5 MILLIGRAM(S): 10 TABLET ORAL at 18:08

## 2021-04-07 RX ADMIN — MEMANTINE HYDROCHLORIDE 5 MILLIGRAM(S): 10 TABLET ORAL at 05:17

## 2021-04-07 RX ADMIN — GABAPENTIN 100 MILLIGRAM(S): 400 CAPSULE ORAL at 13:50

## 2021-04-07 RX ADMIN — Medication 50 MICROGRAM(S): at 05:17

## 2021-04-07 NOTE — PROGRESS NOTE ADULT - ASSESSMENT
Confidential Drug Utilization Report  Search Terms: liza Keller, 1935   Search Date: 03/30/2021 09:12:34 AM   The Drug Utilization Report below displays all of the controlled substance prescriptions, if any, that your patient has filled in the last twelve months. The information displayed on this report is compiled from pharmacy submissions to the Department, and accurately reflects the information as submitted by the pharmacies.  This report was requested by: Elena Conde | Reference #: [id number]   There are no results for the search terms that you entered.  
86F DNR/DNI from home alone, HHA 9am-5pm, PMH DM, HTN, HLD, dementia c/b sundowning, recent left-sided neck pain (recent rx gabapentin) BIB niece for NH placement given lack of HHA assistance at night., admitted to [facility] for placement.
86F DNR/DNI from home alone, HHA 9am-5pm, PMH DM, HTN, HLD, dementia c/b sundowning, recent left-sided neck pain (recent rx gabapentin) BIB niece for NH placement given lack of HHA assistance at night., admitted to [facility] for placement.
86F DNR/DNI from home alone, HHA 9am-5pm, PMH DM, HTN, HLD, dementia c/b sundowning, recent left-sided neck pain (recent rx gabapentin) BIB niece for NH placement given lack of HHA assistance at night., admitted to [facility] for placement.
86F DNR/DNI from home alone, HHA 9am-5pm, PMH DM, HTN, HLD, dementia c/b sundowning, recent left-sided neck pain (recent rx gabapentin) BIB niece for NH placement given lack of HHA assistance at night., admitted to [facility] for placement.
87 yo F from home, lives alone with HHA 9am-5pm, with DM, HTN, HLD, dementia, sundowning, brought in by niece for worsening neck pain and concern for safety at home. Admitted for pain control and dispo plan to NH. 
86F DNR/DNI from home alone, HHA 9am-5pm, PMH DM, HTN, HLD, dementia c/b sundowning, recent left-sided neck pain (recent rx gabapentin) BIB niece for NH placement given lack of HHA assistance at night., admitted to [facility] for placement.
85 yo F from home, lives alone with HHA 9am-5pm, with DM, HTN, HLD, dementia, sundowning, brought in by niece for worsening neck pain and concern for safety at home. Admitted for pain control and dispo plan to NH. 
86F DNR/DNI from home alone, HHA 9am-5pm, PMH DM, HTN, HLD, dementia c/b sundowning, recent left-sided neck pain (recent rx gabapentin) BIB niece for NH placement given lack of HHA assistance at night., admitted to [facility] for placement.

## 2021-04-07 NOTE — PROGRESS NOTE ADULT - SUBJECTIVE AND OBJECTIVE BOX
Patient is a 86y old  Female who presents with a chief complaint of placement (06 Apr 2021 11:11)    pt seen in icu [  ], reg med floor [ x  ], bed [ ], chair at bedside [x  ], a+o x3 [ x ], lethargic [  ],    nad [ x ]      Allergies    No Known Allergies        Vitals    T(F): 98.4 (04-07-21 @ 05:06), Max: 98.8 (04-06-21 @ 13:55)  HR: 74 (04-07-21 @ 05:06) (60 - 84)  BP: 122/52 (04-07-21 @ 05:06) (108/44 - 122/52)  RR: 18 (04-07-21 @ 05:06) (17 - 20)  SpO2: 96% (04-07-21 @ 05:06) (96% - 98%)  Wt(kg): --  CAPILLARY BLOOD GLUCOSE      POCT Blood Glucose.: 174 mg/dL (06 Apr 2021 21:18)      Labs                          12.0   6.41  )-----------( 297      ( 05 Apr 2021 07:30 )             36.5       04-05    137  |  100  |  47<H>  ----------------------------<  96  3.5   |  28  |  1.16    Ca    9.2      05 Apr 2021 07:30  Phos  4.8     04-05  Mg     2.0     04-05    TPro  7.1  /  Alb  3.4<L>  /  TBili  0.6  /  DBili  x   /  AST  37  /  ALT  33  /  AlkPhos  39<L>  04-05                Radiology Results      Meds    MEDICATIONS  (STANDING):  donepezil 5 milliGRAM(s) Oral at bedtime  enoxaparin Injectable 40 milliGRAM(s) SubCutaneous daily  escitalopram 10 milliGRAM(s) Oral daily  gabapentin 100 milliGRAM(s) Oral three times a day  hydrochlorothiazide 12.5 milliGRAM(s) Oral daily  insulin lispro (ADMELOG) corrective regimen sliding scale   SubCutaneous three times a day before meals  insulin lispro (ADMELOG) corrective regimen sliding scale   SubCutaneous at bedtime  levothyroxine 50 MICROGram(s) Oral daily  lidocaine   Patch 1 Patch Transdermal daily  melatonin 3 milliGRAM(s) Oral at bedtime  memantine 5 milliGRAM(s) Oral two times a day  metoprolol tartrate 25 milliGRAM(s) Oral two times a day  pantoprazole    Tablet 40 milliGRAM(s) Oral before breakfast  senna 2 Tablet(s) Oral at bedtime  simvastatin 40 milliGRAM(s) Oral at bedtime      MEDICATIONS  (PRN):      Physical Exam    Neuro :  no focal deficits  Respiratory: CTA B/L  CV: RRR, S1S2, no murmurs,   Abdominal: Soft, NT, ND +BS,  Extremities: No edema, + peripheral pulses    ASSESSMENT  intractable neck pain,   h/o Diabetes Mellitus   Hypertension   HLD   dementia,   c-spine hardware        PLAN      cont pain control   pain mgmt eval noted  Maximize non-opioid pain recommendations   - Continue acetaminophen 650mg PO q 6 hours   - Continue gabapentin 100mg PO TID- pts home medication  - Lidoderm 5% patch daily.   Bowel Regimen  - Senna 2 tablets at bedtime for constipation  Mild pain   - Non-pharmacological pain treatment recommendations  - Warm/ Cool packs PRN   phys tx eval noted and rec phys tx 2-3x/week x 3 weeks at [facility]  soc wk eval  case mgmt eval.  lispro ss   cont lopressor 25 mg q12  cont current meds   d/c planning for ltc to [facility] pending auth

## 2021-04-07 NOTE — PROGRESS NOTE ADULT - PROBLEM SELECTOR PLAN 7
-lipid profile notable for chol 272,   -increased home simvastatin from 20mg to 40mg QHS

## 2021-04-07 NOTE — PROGRESS NOTE ADULT - PROVIDER SPECIALTY LIST ADULT
Internal Medicine
Pain Medicine
Internal Medicine

## 2021-04-07 NOTE — PROGRESS NOTE ADULT - NSICDXPILOT_GEN_ALL_CORE
[place]

## 2021-04-07 NOTE — PROGRESS NOTE ADULT - PROBLEM SELECTOR PROBLEM 3
Tachycardia
Diabetes
Hypothyroidism
Tachycardia
Hypothyroidism
Tachycardia

## 2021-04-07 NOTE — PROGRESS NOTE ADULT - SUBJECTIVE AND OBJECTIVE BOX
PGY-1 Progress Note discussed with attending    PAGER #: [[phone]] TILL 5:00 PM  PLEASE CONTACT ON CALL TEAM:  - On Call Team (Please refer to Curly) FROM 5:00 PM - 8:30PM  - Nightfloat Team FROM 8:30 -7:30 AM    CHIEF COMPLAINT & BRIEF HOSPITAL COURSE:    INTERVAL HPI/OVERNIGHT EVENTS:     REVIEW OF SYSTEMS:  CONSTITUTIONAL: No fever, weight loss, or fatigue  RESPIRATORY: No cough, wheezing, chills or hemoptysis; No shortness of breath  CARDIOVASCULAR: No chest pain, palpitations, dizziness, or leg swelling  GASTROINTESTINAL: No abdominal pain. No nausea, vomiting, or hematemesis; No diarrhea or constipation. No melena or hematochezia.  GENITOURINARY: No dysuria or hematuria, urinary frequency  NEUROLOGICAL: No headaches, memory loss, loss of strength, numbness, or tremors  SKIN: No itching, burning, rashes, or lesions     Vital Signs Last 24 Hrs  T(C): 36.9 (07 Apr 2021 05:06), Max: 37.1 (06 Apr 2021 13:55)  T(F): 98.4 (07 Apr 2021 05:06), Max: 98.8 (06 Apr 2021 13:55)  HR: 74 (07 Apr 2021 05:06) (60 - 84)  BP: 122/52 (07 Apr 2021 05:06) (108/44 - 122/52)  BP(mean): --  RR: 18 (07 Apr 2021 05:06) (17 - 20)  SpO2: 96% (07 Apr 2021 05:06) (96% - 98%)    PHYSICAL EXAMINATION:  GENERAL: NAD, well built  HEAD:  Atraumatic, Normocephalic  EYES:  conjunctiva and sclera clear  NECK: Supple, No JVD, Normal thyroid  CHEST/LUNG: Clear to auscultation. Clear to percussion bilaterally; No rales, rhonchi, wheezing, or rubs  HEART: Regular rate and rhythm; No murmurs, rubs, or gallops  ABDOMEN: Soft, Nontender, Nondistended; Bowel sounds present  NERVOUS SYSTEM:  Alert & Oriented X3,    EXTREMITIES:  2+ Peripheral Pulses, No clubbing, cyanosis, or edema  SKIN: warm dry                      CAPILLARY BLOOD GLUCOSE      RADIOLOGY & ADDITIONAL TESTS:                   PGY-1 Progress Note discussed with attending    PAGER #: [[phone]] TILL 5:00 PM  PLEASE CONTACT ON CALL TEAM:  - On Call Team (Please refer to Curly) FROM 5:00 PM - 8:30PM  - Nightfloat Team FROM 8:30 -7:30 AM    CHIEF COMPLAINT & BRIEF HOSPITAL COURSE:  86F DNR/DNI from home alone, HHA 9am-5pm, PMH DM, HTN, HLD, dementia c/b sundowning, recent left-sided neck pain (recent rx gabapentin) BIB niece 3/29 for NH placement given lack of HHA assistance at night. In ED, afebrile, SBPs 120-140s, HR 120s, SpO2 98 RA. EKG sinus tach. Adm labs unremarkable, trop x1, UA, and COVID negative. Given x2 doses Haldol 2mg IM and Zyprexa 2.5mg PO in ED for agitation. Admitted to [facility] for placement.    Cervical spine hardware noted on CXR, otherwise unremarkable. Endorsed posterior neck pain, assessed by pain management to be somatic, neuropathic, and chronic. Given age and comorbidities, polypharmacy, IV opioids, NSAIDs, and benzos should be avoided. Managed with Tylenol 650mg PO q6, home gabapentin 100mg PO TID, and daily lidocaine patch over affected area. Pending placement     REVIEW OF SYSTEMS:  CONSTITUTIONAL: No fever, weight loss, or fatigue  RESPIRATORY: No cough, wheezing, chills or hemoptysis; No shortness of breath  CARDIOVASCULAR: No chest pain, palpitations, dizziness, or leg swelling  GASTROINTESTINAL: No abdominal pain. No nausea, vomiting, or hematemesis; No diarrhea or constipation. No melena or hematochezia.  GENITOURINARY: No dysuria or hematuria, urinary frequency  NEUROLOGICAL: No headaches, memory loss, loss of strength, numbness, or tremors  SKIN: No itching, burning, rashes, or lesions     Vital Signs Last 24 Hrs  T(C): 36.9 (07 Apr 2021 05:06), Max: 37.1 (06 Apr 2021 13:55)  T(F): 98.4 (07 Apr 2021 05:06), Max: 98.8 (06 Apr 2021 13:55)  HR: 74 (07 Apr 2021 05:06) (60 - 84)  BP: 122/52 (07 Apr 2021 05:06) (108/44 - 122/52)  BP(mean): --  RR: 18 (07 Apr 2021 05:06) (17 - 20)  SpO2: 96% (07 Apr 2021 05:06) (96% - 98%)    PHYSICAL EXAMINATION:  GENERAL: NAD, well built  HEAD:  Atraumatic, Normocephalic  EYES:  conjunctiva and sclera clear  NECK: Supple, No JVD, Normal thyroid  CHEST/LUNG: Clear to auscultation. Clear to percussion bilaterally; No rales, rhonchi, wheezing, or rubs  HEART: Regular rate and rhythm; No murmurs, rubs, or gallops  ABDOMEN: Soft, Nontender, Nondistended; Bowel sounds present  NERVOUS SYSTEM:  Alert & Oriented X3,    EXTREMITIES:  2+ Peripheral Pulses, No clubbing, cyanosis, or edema  SKIN: warm dry                      CAPILLARY BLOOD GLUCOSE      RADIOLOGY & ADDITIONAL TESTS:

## 2021-04-07 NOTE — PROGRESS NOTE ADULT - REASON FOR ADMISSION
placement

## 2021-04-07 NOTE — DISCHARGE NOTE NURSING/CASE MANAGEMENT/SOCIAL WORK - PATIENT PORTAL LINK FT
You can access the FollowMyHealth Patient Portal offered by [facility] by registering at the following website: [email or web address]. By joining [facility]’s FollowMyHealth portal, you will also be able to view your health information using other applications (apps) compatible with our system.

## 2021-04-07 NOTE — PROGRESS NOTE ADULT - PROBLEM SELECTOR PROBLEM 4
Hypothyroidism
HTN (hypertension)
Hypothyroidism
Hypothyroidism
HTN (hypertension)
HTN (hypertension)
Hypothyroidism

## 2021-04-07 NOTE — PROGRESS NOTE ADULT - PROBLEM SELECTOR PLAN 8
-DVT PPX SQL  -GI PPX PPI  -dc home Claritin for allergies
-DVT PPX SQL  -GI PPX PPI  -continue home Claritin for allergies
-DVT PPX SQL  -GI PPX PPI  -dc home Claritin for allergies

## 2021-07-08 NOTE — PROGRESS NOTE ADULT - PROBLEM SELECTOR PROBLEM 5
HTN (hypertension)
HTN (hypertension)
Hypothyroidism
Diabetes
HTN (hypertension)
Diabetes
HTN (hypertension)
negative...
HTN (hypertension)
HTN (hypertension)

## 2022-04-06 NOTE — ED PROVIDER NOTE - CHIEF COMPLAINT
Photo Preface (Leave Blank If You Do Not Want): Photographs were obtained today
Detail Level: Zone
The patient is a 86y Female complaining of pain, neck.

## 2022-04-29 ENCOUNTER — OUTPATIENT (OUTPATIENT)
Dept: OUTPATIENT SERVICES | Facility: HOSPITAL | Age: 87
LOS: 1 days | End: 2022-04-29
Payer: MEDICARE

## 2022-04-29 DIAGNOSIS — R22.1 LOCALIZED SWELLING, MASS AND LUMP, NECK: ICD-10-CM

## 2022-04-29 PROCEDURE — 70490 CT SOFT TISSUE NECK W/O DYE: CPT | Mod: MH

## 2023-04-07 PROBLEM — Z00.00 ENCOUNTER FOR PREVENTIVE HEALTH EXAMINATION: Status: ACTIVE | Noted: 2023-04-07

## 2024-03-20 NOTE — DISCHARGE NOTE PROVIDER - NSDCCPCAREPLAN_GEN_ALL_CORE_FT
decreased balance during turns/losing balance/decreased weight-shifting ability PRINCIPAL DISCHARGE DIAGNOSIS  Diagnosis: Dementia  Assessment and Plan of Treatment:       SECONDARY DISCHARGE DIAGNOSES  Diagnosis: Neck pain  Assessment and Plan of Treatment:     Diagnosis: Hypothyroidism  Assessment and Plan of Treatment: You have a history of poor thyroid function, or hypothyroidism, which can slow your metabolism, worsen symptoms of dementia, and cause numerous other issues. This admission, testing of your thyroid showed normal function. Continue to take your home SYNTHROID 50mcg ONCE A DAY as     PRINCIPAL DISCHARGE DIAGNOSIS  Diagnosis: Dementia  Assessment and Plan of Treatment: You have a history of progressive, irreversible cognitive decline, or dementia, and have been having difficulty caring for yourself at home. You were brought to the hospital for placement in a long-term care facility, where a team can work with you to make sure you are well-cared for.   Continue to take your home MEMANTINE 5mg TWICE A DAY and DONEPEZIL 5mg ONCE A DAY to slow the progression of dementia. Take your home ESCITALOPRAM 10mg ONCE A DAY to prevent dementia-related depression. You will continue to receive care for these conditions at your long term care facility.      SECONDARY DISCHARGE DIAGNOSES  Diagnosis: Neck pain  Assessment and Plan of Treatment: When you first came to the hospital, you were experiencing severe neck pain. You have had operative interventions in this area in the past, and your pain is likely a combination from nerve, muscle, and bone issues.  Our pain specialists saw you while inpatient, and we recommend that you continue your home GABAPENTIN 100mg THREE TIMES A DAY to help reduce nerve-related aspects of the pain and a DAILY LIDOCAINE PATCHE to help with more superficial/muscular pain. You may also take over-the-counter TYLENOL as directed, with dosages not to exceed 4g per day, and course of treatment not to exceed 4 days.   Your team at the long-term care facility will continue to manage this condition with you.    Diagnosis: Hypothyroidism  Assessment and Plan of Treatment: You have a history of poor thyroid function, or hypothyroidism, which can slow your metabolism, worsen symptoms of dementia, and cause numerous other issues. This admission, testing of your thyroid showed normal function. Continue to take your home SYNTHROID 50mcg ONCE A DAY as previously. Follow up with your primary care doctor or home endocrinologist per routine for monitoring and management.     PRINCIPAL DISCHARGE DIAGNOSIS  Diagnosis: Dementia  Assessment and Plan of Treatment: You have a history of progressive, irreversible cognitive decline, or dementia, and have been having difficulty caring for yourself at home. You were brought to the hospital for placement in a long-term care facility, where a team can work with you to make sure you are well-cared for.   Continue to take your home MEMANTINE 5mg TWICE A DAY and DONEPEZIL 5mg ONCE A DAY to slow the progression of dementia. Take your home ESCITALOPRAM 10mg ONCE A DAY to prevent dementia-related depression. You will continue to receive care for these conditions at your long term care facility.      SECONDARY DISCHARGE DIAGNOSES  Diagnosis: Neck pain  Assessment and Plan of Treatment: When you first came to the hospital, you were experiencing severe neck pain. You have had operative interventions in this area in the past, and your pain is likely a combination from nerve, muscle, and bone issues.  Our pain specialists saw you while inpatient, and we recommend that you continue your home GABAPENTIN 100mg THREE TIMES A DAY to help reduce nerve-related aspects of the pain and a DAILY LIDOCAINE PATCH to help with more superficial/muscular pain. You may also take over-the-counter TYLENOL as directed, with dosages not to exceed 4g per day, and course of treatment not to exceed 4 days.   Your team at the long-term care [facility] will continue to manage this condition with you.    Diagnosis: Hypothyroidism  Assessment and Plan of Treatment: You have a history of poor thyroid function, or hypothyroidism, which can slow your metabolism, worsen symptoms of dementia, and cause numerous other issues. This admission, testing of your thyroid showed normal function. Continue to take your home SYNTHROID 50mcg ONCE A DAY as previously. Follow up with your primary care doctor or home endocrinologist per routine for monitoring and management.    Diagnosis: Diabetes  Assessment and Plan of Treatment: You have a history of diabetes, a disease in which your body cannot regulate your blood sugar appropriately, increasing the risk of heart disease, stroke, kidney failure, vision loss, and neurological issues. Your A1c, a measure of long-term blood sugar control, was 6.8, which suggests relatively good control of your blood sugar given your age.  Continue to take your home METFORMIN ER 1000mg ONCE A DAY and PIOGLITAZONE 45mg ONCE A DAY.   The team at the long-term [facility] will continue to monitor your blood sugars and manage you medication regimen.  We encourage you to eat a healthy diet low in sugar and high in fiber, as this may also help to slow/prevent worsening of diabetes. Exercise as tolerated, with a goal of at least 30 minutes a day, 5 days a week.  Diabetes can cause vision and neurological issues, as mentioned above. You are encouraged to be seen by an ohthalmologist once a year for a comprehensive eye exam. Check your feet for cuts every day, as neurological damage can cause numbness and poor wound healing.     PRINCIPAL DISCHARGE DIAGNOSIS  Diagnosis: Dementia  Assessment and Plan of Treatment: You have a history of progressive, irreversible cognitive decline, or dementia, and have been having difficulty caring for yourself at home. You were brought to the hospital for placement in a long-term care facility, where a team can work with you to make sure you are well-cared for.   Continue to take your home MEMANTINE 5mg TWICE A DAY and DONEPEZIL 5mg ONCE A DAY to slow the progression of dementia. Take your home ESCITALOPRAM 10mg ONCE A DAY to prevent dementia-related depression. You will continue to receive care for these conditions at your long term care facility.      SECONDARY DISCHARGE DIAGNOSES  Diagnosis: Neck pain  Assessment and Plan of Treatment: When you first came to the hospital, you were experiencing severe neck pain. You have had operative interventions in this area in the past, and your pain is likely a combination from nerve, muscle, and bone issues.  Our pain specialists saw you while inpatient, and we recommend that you continue your home GABAPENTIN 100mg THREE TIMES A DAY to help reduce nerve-related aspects of the pain and a DAILY LIDOCAINE PATCH to help with more superficial/muscular pain. You may also take over-the-counter TYLENOL as directed, with dosages not to exceed 4g per day, and course of treatment not to exceed 4 days.   Your team at the long-term care [facility] will continue to manage this condition with you.    Diagnosis: Hypothyroidism  Assessment and Plan of Treatment: You have a history of poor thyroid function, or hypothyroidism, which can slow your metabolism, worsen symptoms of dementia, and cause numerous other issues. This admission, testing of your thyroid showed normal function. Continue to take your home SYNTHROID 50mcg ONCE A DAY as previously. Follow up with your primary care doctor or home endocrinologist per routine for monitoring and management.    Diagnosis: Diabetes  Assessment and Plan of Treatment: You have a history of diabetes, a disease in which your body cannot regulate your blood sugar appropriately, increasing the risk of heart disease, stroke, kidney failure, vision loss, and neurological issues. Your A1c, a measure of long-term blood sugar control, was 6.8, which suggests relatively good control of your blood sugar given your age.  Continue to take your home METFORMIN ER 1000mg ONCE A DAY and PIOGLITAZONE 45mg ONCE A DAY.   The team at the long-term [facility] will continue to monitor your blood sugars and manage you medication regimen.  We encourage you to eat a healthy diet low in sugar and high in fiber, as this may also help to slow/prevent worsening of diabetes. Exercise as tolerated, with a goal of at least 30 minutes a day, 5 days a week.  Diabetes can cause vision and neurological issues, as mentioned above. You are encouraged to be seen by an ohthalmologist once a year for a comprehensive eye exam. Check your feet for cuts every day, as neurological damage can cause numbness and poor wound healing.    Diagnosis: HLD (hyperlipidemia)  Assessment and Plan of Treatment: You have a history of high cholesterol, which increases the risks of heart disease, strokes, and many other issues. Your cholesterol was found to be high this admission despite your home anti-cholesterol medication. We have increased the dose of your home medication. Take SIMVSTATIN 40mg ONCE A DAY.   Your team at the [facility] will continue to monitor your cholesterol and manage your medications accordingly.    Diagnosis: HTN (hypertension)  Assessment and Plan of Treatment: You have a history of high blood pressure, which increases the risks of heart disease, strokes, and many other issues. Continue to take your home HYDROCHLOROTHIAZIDE 12.5mg ONCE A DAY as previously. We have also started you on METOPROLOL _______, which will help both with your blood pressure and with your heart rate, which tends to run fast.   Your blood pressures and medication regimen will continue to be monitored at your long-[facility] facility.  We also recommend that you follow a DASH diet, in which vegetables, fruits, and low-fat dairy foods are the mainstays of meals, with moderate amounts of whole grains, fish, poultry, and nuts. Recommended sodium levels in this diet are 1.5g per day.    Diagnosis: Chronic GERD  Assessment and Plan of Treatment: When you were admitted, we found that you had been taking medications to prevent gastric reflux. However, these medications are not without risks, and you have not been complaining of symptoms this admission. For now, STOP taking OMEPRAZOLE and LORATIDINE. Discuss these medications with your primary team at the long-[facility] to determine if you need to restart these at any time.

## 2024-04-01 RX ORDER — GABAPENTIN 400 MG/1
1 CAPSULE ORAL
Qty: 0 | Refills: 0 | DISCHARGE

## 2024-04-01 RX ORDER — OMEPRAZOLE 10 MG/1
1 CAPSULE, DELAYED RELEASE ORAL
Qty: 0 | Refills: 0 | DISCHARGE

## 2024-04-01 RX ORDER — ATORVASTATIN CALCIUM 80 MG/1
1 TABLET, FILM COATED ORAL
Qty: 0 | Refills: 0 | DISCHARGE

## 2024-04-01 RX ORDER — DONEPEZIL HYDROCHLORIDE 10 MG/1
1 TABLET, FILM COATED ORAL
Qty: 0 | Refills: 0 | DISCHARGE

## 2024-04-01 RX ORDER — ESCITALOPRAM OXALATE 10 MG/1
1 TABLET, FILM COATED ORAL
Qty: 0 | Refills: 0 | DISCHARGE

## 2024-04-01 RX ORDER — HYDROCHLOROTHIAZIDE 25 MG
1 TABLET ORAL
Qty: 0 | Refills: 0 | DISCHARGE

## 2024-04-01 RX ORDER — METFORMIN HYDROCHLORIDE 850 MG/1
1 TABLET ORAL
Qty: 0 | Refills: 0 | DISCHARGE

## 2024-04-01 RX ORDER — LEVOTHYROXINE SODIUM 125 MCG
1 TABLET ORAL
Qty: 0 | Refills: 0 | DISCHARGE

## 2024-04-01 RX ORDER — PIOGLITAZONE HYDROCHLORIDE 15 MG/1
1 TABLET ORAL
Qty: 0 | Refills: 0 | DISCHARGE

## 2024-04-01 RX ORDER — MEMANTINE HYDROCHLORIDE 10 MG/1
1 TABLET ORAL
Qty: 0 | Refills: 0 | DISCHARGE

## 2024-04-01 RX ORDER — SIMVASTATIN 20 MG/1
1 TABLET, FILM COATED ORAL
Qty: 0 | Refills: 0 | DISCHARGE

## 2024-04-01 RX ORDER — LORATADINE 10 MG/1
1 TABLET ORAL
Qty: 0 | Refills: 0 | DISCHARGE

## 2025-01-30 NOTE — PATIENT PROFILE ADULT - NSPROHMSYMPCOND_GEN_A_NUR
April Karin notified of neurology recommendation for BP<140/90. Order for PRN hydralazine placed.    unknown, pt is confused